# Patient Record
Sex: FEMALE | Race: WHITE | NOT HISPANIC OR LATINO | URBAN - METROPOLITAN AREA
[De-identification: names, ages, dates, MRNs, and addresses within clinical notes are randomized per-mention and may not be internally consistent; named-entity substitution may affect disease eponyms.]

---

## 2023-05-03 ENCOUNTER — OFFICE VISIT (OUTPATIENT)
Dept: INTERNAL MEDICINE CLINIC | Facility: CLINIC | Age: 53
End: 2023-05-03

## 2023-05-03 VITALS
BODY MASS INDEX: 36.62 KG/M2 | HEART RATE: 79 BPM | SYSTOLIC BLOOD PRESSURE: 122 MMHG | DIASTOLIC BLOOD PRESSURE: 76 MMHG | WEIGHT: 199 LBS | OXYGEN SATURATION: 98 % | HEIGHT: 62 IN | TEMPERATURE: 97.3 F

## 2023-05-03 DIAGNOSIS — I10 PRIMARY HYPERTENSION: ICD-10-CM

## 2023-05-03 DIAGNOSIS — N95.1 POSTMENOPAUSAL SYNDROME: ICD-10-CM

## 2023-05-03 DIAGNOSIS — E78.1 HYPERTRIGLYCERIDEMIA: ICD-10-CM

## 2023-05-03 DIAGNOSIS — K29.70 GASTRITIS WITHOUT BLEEDING, UNSPECIFIED CHRONICITY, UNSPECIFIED GASTRITIS TYPE: ICD-10-CM

## 2023-05-03 DIAGNOSIS — G25.81 RESTLESS LEG SYNDROME: ICD-10-CM

## 2023-05-03 DIAGNOSIS — E78.5 HYPERLIPIDEMIA, UNSPECIFIED HYPERLIPIDEMIA TYPE: ICD-10-CM

## 2023-05-03 DIAGNOSIS — G47.31 PRIMARY CENTRAL SLEEP APNEA: ICD-10-CM

## 2023-05-03 DIAGNOSIS — E11.40 TYPE 2 DIABETES MELLITUS WITH DIABETIC NEUROPATHY, WITHOUT LONG-TERM CURRENT USE OF INSULIN (HCC): Primary | ICD-10-CM

## 2023-05-03 DIAGNOSIS — Z12.31 BREAST CANCER SCREENING BY MAMMOGRAM: ICD-10-CM

## 2023-05-03 RX ORDER — BROMOCRIPTINE MESYLATE 2.5 MG/1
2.5 TABLET ORAL DAILY
COMMUNITY
Start: 2023-03-16

## 2023-05-03 RX ORDER — METFORMIN HYDROCHLORIDE 500 MG/1
2 TABLET, EXTENDED RELEASE ORAL 2 TIMES DAILY WITH MEALS
COMMUNITY
Start: 2023-01-24

## 2023-05-03 RX ORDER — FENOFIBRATE 145 MG/1
145 TABLET, COATED ORAL DAILY
COMMUNITY
Start: 2023-01-05 | End: 2024-01-05

## 2023-05-03 RX ORDER — DICYCLOMINE HYDROCHLORIDE 10 MG/1
10 CAPSULE ORAL 2 TIMES DAILY
COMMUNITY
Start: 2023-03-16 | End: 2024-03-15

## 2023-05-03 RX ORDER — EZETIMIBE 10 MG/1
10 TABLET ORAL DAILY
COMMUNITY
Start: 2023-03-16

## 2023-05-03 RX ORDER — GABAPENTIN 300 MG/1
300 CAPSULE ORAL 2 TIMES DAILY
COMMUNITY
Start: 2023-01-03

## 2023-05-03 RX ORDER — ESTRADIOL 1 MG/1
1 TABLET ORAL DAILY
COMMUNITY
Start: 2023-03-09

## 2023-05-03 RX ORDER — LISINOPRIL 10 MG/1
10 TABLET ORAL DAILY
COMMUNITY
Start: 2023-03-16

## 2023-05-03 RX ORDER — CHLORAL HYDRATE 500 MG
2000 CAPSULE ORAL DAILY
Qty: 180 CAPSULE | Refills: 3 | Status: SHIPPED | OUTPATIENT
Start: 2023-05-03

## 2023-05-03 RX ORDER — CHLORAL HYDRATE 500 MG
2 CAPSULE ORAL 2 TIMES DAILY
COMMUNITY
Start: 2022-11-04 | End: 2023-05-03

## 2023-05-03 RX ORDER — OMEPRAZOLE 20 MG/1
20 CAPSULE, DELAYED RELEASE ORAL DAILY
COMMUNITY
Start: 2023-03-16

## 2023-05-03 NOTE — PROGRESS NOTES
Name: Leelee Clark      : 1970      MRN: [id number]  Encounter Provider: Julita Choudhury MD  Encounter Date: 5/3/2023   Encounter department: [address]     1  Breast cancer screening by mammogram  -     Mammo screening bilateral w 3d & cad; Future; Expected date: 2023    2  Hyperlipidemia, unspecified hyperlipidemia type  Assessment & Plan:  History of hyperlipidemia recommend continuation of low-cholesterol diet weight reduction and continuation of Zetia updated lipid profile has been requested  Orders:  -     Lipid panel; Future    3  Type 2 diabetes mellitus with diabetic neuropathy, without long-term current use of insulin (HCC)  Assessment & Plan:  Blood sugars trending downward as the patient loses weight will discontinue Januvia as blood sugars have dropped under 100 on several occasions  Continue metformin for now reevaluation in 4 weeks  Lab Results   Component Value Date    HGBA1C 7 6 (H) 10/28/2022       Orders:  -     Comprehensive metabolic panel; Future    4  Restless leg syndrome  Assessment & Plan:  Restless leg syndrome symptoms well controlled on bromocriptine continue 2 5 mg daily  5  Postmenopausal syndrome  Assessment & Plan:  Postmenopausal syndrome currently on Estrace 1 mg daily recommend regular follow-up visits with gynecology  6  Primary hypertension  Assessment & Plan:  Current blood pressure assessment indicates adequate control continue lisinopril at 10 mg/day at this time if patient continues to lose weight may be able to taper dose of medication down further to 5 mg  7  Primary central sleep apnea  Assessment & Plan:  Previously diagnosed with central sleep apnea currently on CPAP device  May be worthwhile rechecking with new study if patient continues to lose weight        8  Hypertriglyceridemia  Assessment & Plan:  Elevated triglycerides due for updated study request forced lipid profile has been provided to the patient continue restricted carbohydrates and concentrated sugars in the diet along with fenofibrate at current dose      9  Gastritis without bleeding, unspecified chronicity, unspecified gastritis type  Assessment & Plan:  Patient has a history of gastritis currently on omeprazole 20 mg daily advised continuation of current therapy no focal tenderness over the stomach on examination today avoid high acid foods and excessive amounts of caffeine consumption  BMI Counseling: Body mass index is 36 99 kg/m²  The BMI is above normal  Nutrition recommendations include decreasing portion sizes, decreasing fast food intake, consuming healthier snacks, limiting drinks that contain sugar, moderation in carbohydrate intake and reducing intake of cholesterol  Exercise recommendations include exercising 3-5 times per week  No pharmacotherapy was ordered  Rationale for BMI follow-up plan is due to patient being overweight or obese  Depression Screening and Follow-up Plan: Patient was screened for depression during today's encounter  They screened negative with a PHQ-2 score of 0  Subjective      Pleasant 57-year-old female patient presents to our office today tablets medical care with our practice  During today's visit with the patient we have reviewed her existing medical conditions as well as current therapy  Her medical history is significant for type 2 diabetes that has been present since 2012  Currently she is on a combination of Januvia and metformin  She is embarked on a diet to reduce calorie consumption which she has been successful with the help of noon program   She currently is down 20 pounds from her previous weights  She reports that checking her blood sugars at home has shown a trend down from 140 glucose typically in the morning before she started losing weight now down to the 90-1 05 range and occasionally under 90    Her last hemoglobin A1c which was performed in April was 6 4%  In view of her decreased blood sugars I have requested that she discontinue her Januvia but continue her metformin  Patient also has a history of hypertension and continues on current therapy for hypertension  She describes no signs or symptoms of uncontrolled hypertension  Patient has a history of peripheral neuropathy in both lower extremities believed to be secondary to diabetes currently she is on Neurontin 300 mg twice a day with good control of her symptoms  Patient is also been diagnosed with hyperlipidemia and hypertriglyceridemia and is under treatment with Zetia as well as fenofibrate and 3 fatty acids  She has proven to be intolerant of statin therapy  Patient does have a history of gastritis and acid reflux symptoms currently on 20 mg of Prilosec with good control    Patient has a history of restless leg syndrome as well as postmenopausal syndrome  Her surgical history is significant for a hysterectomy with 1 ovary remaining  Also has a history of carpal tunnel syndrome surgical correction of septal deviation and urethral dilatation as a child  She is also been diagnosed in the past with sleep apnea and uses a CPAP machine on a nightly basis  The patient's colonoscopy was most recently performed 2 years ago with no evidence of any polyps and will be due for a follow-up colonoscopy 10 years from that previous study  Patient is due for a mammogram and a request for this examination was provided to her today  Review of Systems   Neurological: Positive for light-headedness  All other systems reviewed and are negative        Current Outpatient Medications on File Prior to Visit   Medication Sig    bromocriptine (PARLODEL) 2 5 mg tablet Take 2 5 mg by mouth daily    dicyclomine (BENTYL) 10 mg capsule Take 10 mg by mouth 2 (two) times a day    estradiol (ESTRACE) 1 mg tablet Take 1 mg by mouth daily    ezetimibe (ZETIA) 10 mg tablet Take 10 mg by mouth daily    "fenofibrate (TRICOR) 145 mg tablet Take 145 mg by mouth daily    gabapentin (NEURONTIN) 300 mg capsule Take 300 mg by mouth 2 (two) times a day    lisinopril (ZESTRIL) 10 mg tablet Take 10 mg by mouth daily    metFORMIN (GLUCOPHAGE-XR) 500 mg 24 hr tablet Take 2 tablets by mouth 2 (two) times a day with meals    omeprazole (PriLOSEC) 20 mg delayed release capsule Take 20 mg by mouth daily    [DISCONTINUED] Omega-3 Fatty Acids (fish oil) 1,000 mg Take 2 g by mouth 2 (two) times a day    [DISCONTINUED] sitaGLIPtin (JANUVIA) 50 mg tablet Take 50 mg by mouth daily       Objective     /76   Pulse 79   Temp (!) 97 3 °F (36 3 °C)   Ht 5' 1 5\" (1 562 m)   Wt 90 3 kg (199 lb)   SpO2 98%   BMI 36 99 kg/m²     Physical Exam  Vitals reviewed  Constitutional:       General: She is not in acute distress  Appearance: Normal appearance  She is well-developed  She is not ill-appearing  HENT:      Head: Normocephalic  Right Ear: Hearing, tympanic membrane, ear canal and external ear normal       Left Ear: Hearing, tympanic membrane, ear canal and external ear normal       Nose: Nose normal  No mucosal edema  Mouth/Throat:      Mouth: Mucous membranes are moist       Pharynx: Oropharynx is clear  Uvula midline  Eyes:      General: Lids are normal       Extraocular Movements: Extraocular movements intact  Conjunctiva/sclera: Conjunctivae normal       Pupils: Pupils are equal, round, and reactive to light  Neck:      Thyroid: No thyromegaly  Vascular: No carotid bruit or JVD  Cardiovascular:      Rate and Rhythm: Normal rate and regular rhythm  Heart sounds: Normal heart sounds  No murmur heard  Pulmonary:      Effort: Pulmonary effort is normal  No respiratory distress  Breath sounds: Normal breath sounds  No wheezing, rhonchi or rales  Abdominal:      General: Bowel sounds are normal  There is no distension  Palpations: Abdomen is soft  There is no mass        " Tenderness: There is no abdominal tenderness  There is no guarding  Musculoskeletal:         General: No swelling or tenderness  Normal range of motion  Cervical back: Normal range of motion and neck supple  No rigidity or tenderness  Right lower leg: No edema  Left lower leg: No edema  Lymphadenopathy:      Cervical: No cervical adenopathy  Skin:     General: Skin is warm and dry  Coloration: Skin is not jaundiced or pale  Neurological:      General: No focal deficit present  Mental Status: She is alert and oriented to person, place, and time  Sensory: Sensory deficit present  Deep Tendon Reflexes: Reflexes are normal and symmetric  Comments: Peripheral neuropathy symptoms in both lower extremities   Psychiatric:         Mood and Affect: Mood normal          Speech: Speech normal          Behavior: Behavior normal  Behavior is cooperative  Thought Content:  Thought content normal          Judgment: Judgment normal        Ganga Ortiz MD

## 2023-05-03 NOTE — ASSESSMENT & PLAN NOTE
Previously diagnosed with central sleep apnea currently on CPAP device  May be worthwhile rechecking with new study if patient continues to lose weight

## 2023-05-03 NOTE — ASSESSMENT & PLAN NOTE
History of hyperlipidemia recommend continuation of low-cholesterol diet weight reduction and continuation of Zetia updated lipid profile has been requested

## 2023-05-03 NOTE — ASSESSMENT & PLAN NOTE
Elevated triglycerides due for updated study request forced lipid profile has been provided to the patient continue restricted carbohydrates and concentrated sugars in the diet along with fenofibrate at current dose

## 2023-05-03 NOTE — ASSESSMENT & PLAN NOTE
Current blood pressure assessment indicates adequate control continue lisinopril at 10 mg/day at this time if patient continues to lose weight may be able to taper dose of medication down further to 5 mg

## 2023-05-03 NOTE — ASSESSMENT & PLAN NOTE
Patient has a history of gastritis currently on omeprazole 20 mg daily advised continuation of current therapy no focal tenderness over the stomach on examination today avoid high acid foods and excessive amounts of caffeine consumption

## 2023-05-03 NOTE — ASSESSMENT & PLAN NOTE
Blood sugars trending downward as the patient loses weight will discontinue Januvia as blood sugars have dropped under 100 on several occasions  Continue metformin for now reevaluation in 4 weeks    Lab Results   Component Value Date    HGBA1C 7 6 (H) 10/28/2022

## 2023-05-03 NOTE — ASSESSMENT & PLAN NOTE
Postmenopausal syndrome currently on Estrace 1 mg daily recommend regular follow-up visits with gynecology

## 2023-06-08 ENCOUNTER — APPOINTMENT (OUTPATIENT)
Dept: LAB | Age: 53
End: 2023-06-08
Payer: COMMERCIAL

## 2023-06-08 DIAGNOSIS — E11.40 TYPE 2 DIABETES MELLITUS WITH DIABETIC NEUROPATHY, WITHOUT LONG-TERM CURRENT USE OF INSULIN (HCC): ICD-10-CM

## 2023-06-08 DIAGNOSIS — E78.5 HYPERLIPIDEMIA, UNSPECIFIED HYPERLIPIDEMIA TYPE: ICD-10-CM

## 2023-06-08 LAB
ALBUMIN SERPL BCP-MCNC: 3.5 G/DL (ref 3.5–5)
ALP SERPL-CCNC: 37 U/L (ref 46–116)
ALT SERPL W P-5'-P-CCNC: 20 U/L (ref 12–78)
ANION GAP SERPL CALCULATED.3IONS-SCNC: 4 MMOL/L (ref 4–13)
AST SERPL W P-5'-P-CCNC: 18 U/L (ref 5–45)
BILIRUB SERPL-MCNC: 0.29 MG/DL (ref 0.2–1)
BUN SERPL-MCNC: 14 MG/DL (ref 5–25)
CALCIUM SERPL-MCNC: 8.9 MG/DL (ref 8.3–10.1)
CHLORIDE SERPL-SCNC: 108 MMOL/L (ref 96–108)
CHOLEST SERPL-MCNC: 157 MG/DL
CO2 SERPL-SCNC: 26 MMOL/L (ref 21–32)
CREAT SERPL-MCNC: 0.9 MG/DL (ref 0.6–1.3)
GFR SERPL CREATININE-BSD FRML MDRD: 72 ML/MIN/1.73SQ M
GLUCOSE P FAST SERPL-MCNC: 100 MG/DL (ref 65–99)
HDLC SERPL-MCNC: 33 MG/DL
LDLC SERPL CALC-MCNC: 63 MG/DL (ref 0–100)
NONHDLC SERPL-MCNC: 124 MG/DL
POTASSIUM SERPL-SCNC: 4.1 MMOL/L (ref 3.5–5.3)
PROT SERPL-MCNC: 6.7 G/DL (ref 6.4–8.4)
SODIUM SERPL-SCNC: 138 MMOL/L (ref 135–147)
TRIGL SERPL-MCNC: 305 MG/DL

## 2023-06-08 PROCEDURE — 80053 COMPREHEN METABOLIC PANEL: CPT

## 2023-06-08 PROCEDURE — 80061 LIPID PANEL: CPT

## 2023-06-08 PROCEDURE — 36415 COLL VENOUS BLD VENIPUNCTURE: CPT

## 2023-06-09 ENCOUNTER — OFFICE VISIT (OUTPATIENT)
Dept: INTERNAL MEDICINE CLINIC | Facility: CLINIC | Age: 53
End: 2023-06-09
Payer: COMMERCIAL

## 2023-06-09 VITALS
TEMPERATURE: 97.7 F | HEART RATE: 65 BPM | BODY MASS INDEX: 34.85 KG/M2 | OXYGEN SATURATION: 98 % | WEIGHT: 189.4 LBS | HEIGHT: 62 IN

## 2023-06-09 DIAGNOSIS — E78.5 HYPERLIPIDEMIA, UNSPECIFIED HYPERLIPIDEMIA TYPE: ICD-10-CM

## 2023-06-09 DIAGNOSIS — G25.81 RESTLESS LEG SYNDROME: ICD-10-CM

## 2023-06-09 DIAGNOSIS — E11.40 TYPE 2 DIABETES MELLITUS WITH DIABETIC NEUROPATHY, WITHOUT LONG-TERM CURRENT USE OF INSULIN (HCC): Primary | ICD-10-CM

## 2023-06-09 DIAGNOSIS — E78.1 HYPERTRIGLYCERIDEMIA: ICD-10-CM

## 2023-06-09 DIAGNOSIS — K58.9 IRRITABLE BOWEL SYNDROME WITHOUT DIARRHEA: ICD-10-CM

## 2023-06-09 DIAGNOSIS — I10 PRIMARY HYPERTENSION: ICD-10-CM

## 2023-06-09 LAB — SL AMB POCT HEMOGLOBIN AIC: 5.9 (ref ?–6.5)

## 2023-06-09 PROCEDURE — 99214 OFFICE O/P EST MOD 30 MIN: CPT | Performed by: INTERNAL MEDICINE

## 2023-06-09 PROCEDURE — 83036 HEMOGLOBIN GLYCOSYLATED A1C: CPT | Performed by: INTERNAL MEDICINE

## 2023-06-09 RX ORDER — LISINOPRIL 10 MG/1
10 TABLET ORAL DAILY
Qty: 90 TABLET | Refills: 3 | Status: SHIPPED | OUTPATIENT
Start: 2023-06-09

## 2023-06-09 RX ORDER — FENOFIBRATE 145 MG/1
145 TABLET, COATED ORAL DAILY
Qty: 90 TABLET | Refills: 3 | Status: SHIPPED | OUTPATIENT
Start: 2023-06-09 | End: 2024-06-08

## 2023-06-09 RX ORDER — CHLORAL HYDRATE 500 MG
2000 CAPSULE ORAL DAILY
Qty: 180 CAPSULE | Refills: 3 | Status: SHIPPED | OUTPATIENT
Start: 2023-06-09 | End: 2023-06-14

## 2023-06-09 RX ORDER — BROMOCRIPTINE MESYLATE 2.5 MG/1
2.5 TABLET ORAL DAILY
Qty: 90 TABLET | Refills: 3 | Status: SHIPPED | OUTPATIENT
Start: 2023-06-09

## 2023-06-09 RX ORDER — EZETIMIBE 10 MG/1
10 TABLET ORAL DAILY
Qty: 90 TABLET | Refills: 3 | Status: SHIPPED | OUTPATIENT
Start: 2023-06-09

## 2023-06-09 RX ORDER — DICYCLOMINE HYDROCHLORIDE 10 MG/1
10 CAPSULE ORAL 2 TIMES DAILY
Qty: 180 CAPSULE | Refills: 3 | Status: SHIPPED | OUTPATIENT
Start: 2023-06-09 | End: 2024-06-08

## 2023-06-09 RX ORDER — METFORMIN HYDROCHLORIDE 500 MG/1
1000 TABLET, EXTENDED RELEASE ORAL 2 TIMES DAILY WITH MEALS
Qty: 360 TABLET | Refills: 3 | Status: SHIPPED | OUTPATIENT
Start: 2023-06-09

## 2023-06-09 RX ORDER — GABAPENTIN 300 MG/1
300 CAPSULE ORAL 2 TIMES DAILY
Qty: 180 CAPSULE | Refills: 3 | Status: SHIPPED | OUTPATIENT
Start: 2023-06-09

## 2023-06-09 NOTE — PROGRESS NOTES
Name: Arpita Pineda      : 1970      MRN: [id number]  Encounter Provider: Stacey Macias MD  Encounter Date: 2023   Encounter department: [address]     1  Type 2 diabetes mellitus with diabetic neuropathy, without long-term current use of insulin ([facility])  Assessment & Plan:  Current blood work includes hemoglobin A1c of 5 9% and home fasting blood sugar diary indicates good control of the type 2 diabetes in this patient continue careful diet continue efforts to reduce weight and continue metformin at 1000 mg twice a day  Lab Results   Component Value Date    HGBA1C 5 9 2023       Orders:  -     POCT hemoglobin A1c  -     metFORMIN (GLUCOPHAGE-XR) 500 mg 24 hr tablet; Take 2 tablets (1,000 mg total) by mouth 2 (two) times a day with meals  -     Hemoglobin A1C; Future; Expected date: 10/09/2023  -     Comprehensive metabolic panel; Future; Expected date: 10/09/2023    2  Primary hypertension  Assessment & Plan:  Blood pressure assessment today confirms good control recommend continuation of lisinopril at 10 mg daily    Orders:  -     lisinopril (ZESTRIL) 10 mg tablet; Take 1 tablet (10 mg total) by mouth daily    3  Hyperlipidemia, unspecified hyperlipidemia type  Assessment & Plan:  Cholesterol profile reviewed with patient recommend continuation of low-cholesterol diet weight reduction and continuation of Zetia 10 mg daily    Orders:  -     ezetimibe (ZETIA) 10 mg tablet; Take 1 tablet (10 mg total) by mouth daily  -     fenofibrate (TRICOR) 145 mg tablet; Take 1 tablet (145 mg total) by mouth daily  -     Omega-3 Fatty Acids (fish oil) 1,000 mg; Take 2 capsules (2,000 mg total) by mouth daily  -     Lipid panel; Future; Expected date: 10/09/2023    4   Restless leg syndrome  Assessment & Plan:  Restless leg syndrome remains well controlled and stable on Parlodel 2 5 mg daily new prescription provided for this medication    Orders:  - gabapentin (NEURONTIN) 300 mg capsule; Take 1 capsule (300 mg total) by mouth 2 (two) times a day  -     bromocriptine (PARLODEL) 2 5 mg tablet; Take 1 tablet (2 5 mg total) by mouth daily    5  Irritable bowel syndrome without diarrhea  -     dicyclomine (BENTYL) 10 mg capsule; Take 1 capsule (10 mg total) by mouth 2 (two) times a day    6  Hypertriglyceridemia  Assessment & Plan:  Triglyceride profile reviewed with the patient recommend continued efforts to reduce carbohydrate consumption reduce weight and continue fenofibrate 145 mg daily and omega-3 fatty acid supplementation at 2 g daily           Subjective      This pleasant 52-year-old female patient returns to our office today to review recent blood work need to cholesterol triglyceride and diabetic management  Also a review of her hypertension  On her last visit we discontinued her Jardiance medication as blood sugars seem to be trending downward  Follow-up today indicates good blood sugar control seen just metformin to 1000 mg twice a day  She brings in with her today a diary of her home blood sugars which average in the not high 90-1 20 range  No hypoglycemic episodes are noted and no extremely high glucose values are noted  Diabetes  She has type 2 diabetes mellitus  No MedicAlert identification noted  The initial diagnosis of diabetes was made 11 years ago  Pertinent negatives for hypoglycemia include no confusion, dizziness, headaches, hunger, mood changes, nervousness/anxiousness, pallor, seizures, sleepiness, speech difficulty, sweats or tremors  Associated symptoms include weight loss  Pertinent negatives for diabetes include no blurred vision, no chest pain, no fatigue, no foot paresthesias, no foot ulcerations, no polydipsia, no polyphagia, no polyuria, no visual change and no weakness   Pertinent negatives for hypoglycemia complications include no blackouts, no hospitalization, no nocturnal hypoglycemia, no required assistance and no required glucagon injection  Symptoms are improving  Diabetic complications include peripheral neuropathy  Pertinent negatives for diabetic complications include no CVA, heart disease, impotence, nephropathy, PVD or retinopathy  Risk factors for coronary artery disease include dyslipidemia, family history, hypertension, obesity and post-menopausal  Current diabetic treatment includes oral agent (monotherapy)  She is compliant with treatment all of the time  Her weight is decreasing steadily  She is following a generally healthy diet  When asked about meal planning, she reported none  She has not had a previous visit with a dietitian  She participates in exercise every other day  She monitors blood glucose at home 1-2 x per day  She monitors urine at home <1 x per month  Blood glucose monitoring compliance is good  There is no change in her home blood glucose trend  Her breakfast blood glucose is taken between 6-7 am  Her breakfast blood glucose range is generally  mg/dl  She sees a podiatrist Eye exam is current  Review of Systems   Constitutional: Positive for weight loss  Negative for fatigue  Eyes: Negative for blurred vision  Cardiovascular: Negative for chest pain  Endocrine: Negative for polydipsia, polyphagia and polyuria  Genitourinary: Negative for impotence  Skin: Negative for pallor  Neurological: Negative for dizziness, tremors, seizures, speech difficulty, weakness and headaches  Psychiatric/Behavioral: Negative for confusion  The patient is not nervous/anxious  All other systems reviewed and are negative        Current Outpatient Medications on File Prior to Visit   Medication Sig   • estradiol (ESTRACE) 1 mg tablet Take 1 mg by mouth daily   • omeprazole (PriLOSEC) 20 mg delayed release capsule Take 20 mg by mouth daily   • [DISCONTINUED] bromocriptine (PARLODEL) 2 5 mg tablet Take 2 5 mg by mouth daily   • [DISCONTINUED] dicyclomine (BENTYL) 10 mg capsule Take 10 mg by mouth "2 (two) times a day   • [DISCONTINUED] ezetimibe (ZETIA) 10 mg tablet Take 10 mg by mouth daily   • [DISCONTINUED] fenofibrate (TRICOR) 145 mg tablet Take 145 mg by mouth daily   • [DISCONTINUED] gabapentin (NEURONTIN) 300 mg capsule Take 300 mg by mouth 2 (two) times a day   • [DISCONTINUED] lisinopril (ZESTRIL) 10 mg tablet Take 10 mg by mouth daily   • [DISCONTINUED] metFORMIN (GLUCOPHAGE-XR) 500 mg 24 hr tablet Take 2 tablets by mouth 2 (two) times a day with meals   • [DISCONTINUED] Omega-3 Fatty Acids (fish oil) 1,000 mg Take 2 capsules (2,000 mg total) by mouth daily       Objective     Pulse 65   Temp 97 7 °F (36 5 °C)   Ht 5' 1 5\" (1 562 m)   Wt 85 9 kg (189 lb 6 4 oz)   SpO2 98%   BMI 35 21 kg/m²     Physical Exam  Vitals reviewed  Constitutional:       General: She is not in acute distress  Appearance: Normal appearance  She is well-developed  She is not ill-appearing  HENT:      Head: Normocephalic  Right Ear: Hearing and external ear normal       Left Ear: Hearing and external ear normal       Nose: Nose normal  No mucosal edema  Mouth/Throat:      Pharynx: Uvula midline  Eyes:      General: Lids are normal       Conjunctiva/sclera: Conjunctivae normal       Pupils: Pupils are equal, round, and reactive to light  Neck:      Thyroid: No thyromegaly  Vascular: No carotid bruit or JVD  Cardiovascular:      Rate and Rhythm: Normal rate and regular rhythm  Heart sounds: Normal heart sounds  No murmur heard  Pulmonary:      Effort: Pulmonary effort is normal  No respiratory distress  Breath sounds: Normal breath sounds  No wheezing or rhonchi  Abdominal:      General: Bowel sounds are normal       Palpations: Abdomen is soft  Musculoskeletal:         General: Normal range of motion  Lymphadenopathy:      Cervical: No cervical adenopathy  Skin:     General: Skin is warm and dry     Neurological:      Mental Status: She is alert and oriented to person, place, " and time  Mental status is at baseline  Deep Tendon Reflexes: Reflexes are normal and symmetric  Reflexes normal    Psychiatric:         Speech: Speech normal          Behavior: Behavior normal  Behavior is cooperative  Thought Content:  Thought content normal          Judgment: Judgment normal        Eric Ferrell MD

## 2023-06-09 NOTE — ASSESSMENT & PLAN NOTE
Current blood work includes hemoglobin A1c of 5 9% and home fasting blood sugar diary indicates good control of the type 2 diabetes in this patient continue careful diet continue efforts to reduce weight and continue metformin at 1000 mg twice a day  Lab Results   Component Value Date    HGBA1C 5 9 06/09/2023

## 2023-06-09 NOTE — ASSESSMENT & PLAN NOTE
Restless leg syndrome remains well controlled and stable on Parlodel 2 5 mg daily new prescription provided for this medication

## 2023-06-09 NOTE — ASSESSMENT & PLAN NOTE
Blood pressure assessment today confirms good control recommend continuation of lisinopril at 10 mg daily

## 2023-06-09 NOTE — ASSESSMENT & PLAN NOTE
Cholesterol profile reviewed with patient recommend continuation of low-cholesterol diet weight reduction and continuation of Zetia 10 mg daily

## 2023-06-09 NOTE — ASSESSMENT & PLAN NOTE
Triglyceride profile reviewed with the patient recommend continued efforts to reduce carbohydrate consumption reduce weight and continue fenofibrate 145 mg daily and omega-3 fatty acid supplementation at 2 g daily

## 2023-06-12 ENCOUNTER — TELEPHONE (OUTPATIENT)
Dept: ADMINISTRATIVE | Facility: OTHER | Age: 53
End: 2023-06-12

## 2023-06-12 NOTE — TELEPHONE ENCOUNTER
Upon review of the In Basket request and the patient's chart, initial outreach has been made via fax to facility  Please see Contacts section for details       Thank you  Jossue Xavier MA

## 2023-06-12 NOTE — TELEPHONE ENCOUNTER
----- Message from Sunnie Olszewski, [address] sent at 6/9/2023  2:02 PM EDT -----  Regarding: care gap request  06/09/23 2:02 PM    Hello, our patient attached above has had Diabetic Foot Exam completed/performed  Please assist in updating the patient chart by pulling a previous Electronic Medical Record (EMR) document  The previous EMR is dr Frankey Sidles  The date of service is 3/2023    Thank you,  Sunnie Olszewski  [facility] INTERNAL [facility]

## 2023-06-14 DIAGNOSIS — E78.5 HYPERLIPIDEMIA, UNSPECIFIED HYPERLIPIDEMIA TYPE: Primary | ICD-10-CM

## 2023-06-14 RX ORDER — OMEGA-3-ACID ETHYL ESTERS 1 G/1
2 CAPSULE, LIQUID FILLED ORAL DAILY
Qty: 180 CAPSULE | Refills: 3 | Status: SHIPPED | OUTPATIENT
Start: 2023-06-14 | End: 2023-07-07 | Stop reason: CLARIF

## 2023-06-14 NOTE — TELEPHONE ENCOUNTER
Upon review of the In Basket request we were able to locate, review, and update the patient chart as requested for Diabetic Foot Exam     Any additional questions or concerns should be emailed to the Practice Liaisons via the appropriate education email address, please do not reply via In Basket      Thank you  Anika Flower MA

## 2023-06-16 LAB
DME PARACHUTE DELIVERY DATE REQUESTED: NORMAL
DME PARACHUTE ITEM DESCRIPTION: NORMAL
DME PARACHUTE ORDER STATUS: NORMAL
DME PARACHUTE SUPPLIER NAME: NORMAL
DME PARACHUTE SUPPLIER PHONE: NORMAL

## 2023-06-19 LAB
DME PARACHUTE DELIVERY DATE ACTUAL: NORMAL
DME PARACHUTE DELIVERY DATE REQUESTED: NORMAL
DME PARACHUTE ITEM DESCRIPTION: NORMAL
DME PARACHUTE ORDER STATUS: NORMAL
DME PARACHUTE SUPPLIER NAME: NORMAL
DME PARACHUTE SUPPLIER PHONE: NORMAL

## 2023-07-07 ENCOUNTER — TELEPHONE (OUTPATIENT)
Dept: INTERNAL MEDICINE CLINIC | Facility: CLINIC | Age: 53
End: 2023-07-07

## 2023-07-07 DIAGNOSIS — E78.1 HYPERTRIGLYCERIDEMIA: Primary | ICD-10-CM

## 2023-07-07 RX ORDER — ICOSAPENT ETHYL 1000 MG/1
2 CAPSULE ORAL 2 TIMES DAILY
Qty: 360 CAPSULE | Refills: 3 | Status: SHIPPED | OUTPATIENT
Start: 2023-07-07

## 2023-07-07 NOTE — TELEPHONE ENCOUNTER
The doctor was trying to send in a prescription for me for [address] I just spoke with [facility] and apparently my plan does not cover Lovaza. But it does cover [facility]. So if you could just check with Doctor Bin Lot if he is OK with prescribing that instead or if he wants me to go back to using over the counter fish oil. And then if somebody could just get back to me, that would be great. You can call me back. I also checked the portal often.

## 2023-07-14 ENCOUNTER — HOSPITAL ENCOUNTER (OUTPATIENT)
Dept: RADIOLOGY | Age: 53
Discharge: HOME | End: 2023-07-14
Payer: COMMERCIAL

## 2023-07-14 VITALS — BODY MASS INDEX: 32.94 KG/M2 | HEIGHT: 62 IN | WEIGHT: 179 LBS

## 2023-07-14 DIAGNOSIS — Z12.31 BREAST CANCER SCREENING BY MAMMOGRAM: ICD-10-CM

## 2023-07-14 PROCEDURE — 77063 BREAST TOMOSYNTHESIS BI: CPT

## 2023-07-14 PROCEDURE — 77067 SCR MAMMO BI INCL CAD: CPT

## 2023-07-19 ENCOUNTER — TELEPHONE (OUTPATIENT)
Dept: INTERNAL MEDICINE CLINIC | Facility: CLINIC | Age: 53
End: 2023-07-19

## 2023-08-17 ENCOUNTER — OFFICE VISIT (OUTPATIENT)
Dept: PODIATRY | Facility: CLINIC | Age: 53
End: 2023-08-17
Payer: COMMERCIAL

## 2023-08-17 VITALS
HEART RATE: 76 BPM | SYSTOLIC BLOOD PRESSURE: 140 MMHG | HEIGHT: 62 IN | DIASTOLIC BLOOD PRESSURE: 82 MMHG | WEIGHT: 183.2 LBS | BODY MASS INDEX: 33.71 KG/M2

## 2023-08-17 DIAGNOSIS — E11.40 TYPE 2 DIABETES MELLITUS WITH DIABETIC NEUROPATHY, WITHOUT LONG-TERM CURRENT USE OF INSULIN (HCC): Primary | ICD-10-CM

## 2023-08-17 PROCEDURE — 99203 OFFICE O/P NEW LOW 30 MIN: CPT | Performed by: PODIATRIST

## 2023-08-17 NOTE — PROGRESS NOTES
This patient was seen on 8/17/23. My role is Foot , Ankle, and Wound Specialist    SUBJECTIVE    Chief Complaint:  Diabetic foot check     Patient ID: Kenisha Lai is a 52 y.o. female. Juwan Ramachandran is here for the first time for an annual diabetic foot check. She denies barefoot walking nor prior history of foot lesions. Her last A1C was a 5.9 on 6/9/23. The following portions of the patient's history were reviewed and updated as appropriate: allergies, current medications, past family history, past medical history, past social history, past surgical history and problem list.    Review of Systems   Constitutional: Negative. Respiratory: Negative. Skin: Negative for wound. Neurological: Positive for numbness. OBJECTIVE      /82   Pulse 76   Ht 5' 1.5" (1.562 m) Comment: verbal  Wt 83.1 kg (183 lb 3.2 oz)   BMI 34.05 kg/m²     Foot/Ankle Musculoskeletal Exam    Inspection    Right      Right foot/ankle inspection is normal.     Left      Left foot/ankle inspection is normal.      Neurovascular    Right      Dorsalis pedis: 2+      Posterior tibial: 2+    Left      Dorsalis pedis: 2+      Posterior tibial: 2+    General    Neurological: alert       Physical Exam  Vitals and nursing note reviewed. Constitutional:       General: She is not in acute distress. Appearance: Normal appearance. She is not ill-appearing, toxic-appearing or diaphoretic. HENT:      Head: Normocephalic and atraumatic. Cardiovascular:      Pulses: Normal pulses. no weak pulses          Dorsalis pedis pulses are 2+ on the right side and 2+ on the left side. Posterior tibial pulses are 2+ on the right side and 2+ on the left side. Pulmonary:      Effort: Pulmonary effort is normal.   Feet:      Right foot:      Skin integrity: No ulcer, skin breakdown, erythema, warmth, callus or dry skin. Left foot:      Skin integrity: No ulcer, skin breakdown, erythema, warmth, callus or dry skin. Skin:     General: Skin is warm. Capillary Refill: Capillary refill takes less than 2 seconds. Neurological:      General: No focal deficit present. Mental Status: She is alert and oriented to person, place, and time. Diabetic Foot Exam    Patient's shoes and socks removed. Right Foot/Ankle   Right Foot Inspection  Skin Exam: skin normal and skin intact. No dry skin, no warmth, no callus, no erythema, no maceration, no abnormal color, no pre-ulcer, no ulcer and no callus. Toe Exam: ROM and strength within normal limits. Sensory   Vibration: intact  Proprioception: intact  Monofilament testing: intact    Vascular  Capillary refills: < 3 seconds  The right DP pulse is 2+. The right PT pulse is 2+. Left Foot/Ankle  Left Foot Inspection  Skin Exam: skin normal and skin intact. No dry skin, no warmth, no erythema, no maceration, normal color, no pre-ulcer, no ulcer and no callus. Toe Exam: ROM and strength within normal limits. Sensory   Vibration: intact  Proprioception: intact  Monofilament testing: intact    Vascular  Capillary refills: < 3 seconds  The left DP pulse is 2+. The left PT pulse is 2+. Assign Risk Category  No deformity present  No loss of protective sensation  No weak pulses  Risk: 0    ASSESSMENT     Diagnoses and all orders for this visit:    Type 2 diabetes mellitus with diabetic neuropathy, without long-term current use of insulin (HCC)  -     Diabetic Shoe Inserts  -     Diabetic Shoe         Problem List Items Addressed This Visit        Endocrine    Type 2 diabetes mellitus with diabetic neuropathy, without long-term current use of insulin ([address]) - Primary    Relevant Orders    Diabetic Shoe Inserts    Diabetic Shoe           PLAN    Her current risk is very low for diabetic foot complications.  Foot precautions reviewed with patient including the need to wear protective shoegear at all times when walking including in the home, the need to check feet all surfaces daily with a mirror and report and skin breaks to podiatry, the need to apply an emollient to skin of feet daily.  Rx given for diabetic shoegear at her request.

## 2023-08-23 DIAGNOSIS — B00.9 HERPES SIMPLEX: Primary | ICD-10-CM

## 2023-08-23 RX ORDER — VALACYCLOVIR HYDROCHLORIDE 1 G/1
1000 TABLET, FILM COATED ORAL 2 TIMES DAILY
Qty: 10 TABLET | Refills: 0 | Status: SHIPPED | OUTPATIENT
Start: 2023-08-23 | End: 2023-08-28

## 2023-10-11 ENCOUNTER — APPOINTMENT (OUTPATIENT)
Dept: LAB | Age: 53
End: 2023-10-11
Payer: COMMERCIAL

## 2023-10-11 DIAGNOSIS — E78.5 HYPERLIPIDEMIA, UNSPECIFIED HYPERLIPIDEMIA TYPE: ICD-10-CM

## 2023-10-11 DIAGNOSIS — E11.40 TYPE 2 DIABETES MELLITUS WITH DIABETIC NEUROPATHY, WITHOUT LONG-TERM CURRENT USE OF INSULIN (HCC): ICD-10-CM

## 2023-10-11 LAB
ALBUMIN SERPL BCP-MCNC: 4.2 G/DL (ref 3.5–5)
ALP SERPL-CCNC: 31 U/L (ref 34–104)
ALT SERPL W P-5'-P-CCNC: 10 U/L (ref 7–52)
ANION GAP SERPL CALCULATED.3IONS-SCNC: 4 MMOL/L
AST SERPL W P-5'-P-CCNC: 14 U/L (ref 13–39)
BILIRUB SERPL-MCNC: 0.3 MG/DL (ref 0.2–1)
BUN SERPL-MCNC: 16 MG/DL (ref 5–25)
CALCIUM SERPL-MCNC: 9.1 MG/DL (ref 8.4–10.2)
CHLORIDE SERPL-SCNC: 105 MMOL/L (ref 96–108)
CHOLEST SERPL-MCNC: 166 MG/DL
CO2 SERPL-SCNC: 30 MMOL/L (ref 21–32)
CREAT SERPL-MCNC: 0.82 MG/DL (ref 0.6–1.3)
EST. AVERAGE GLUCOSE BLD GHB EST-MCNC: 126 MG/DL
GFR SERPL CREATININE-BSD FRML MDRD: 80 ML/MIN/1.73SQ M
GLUCOSE P FAST SERPL-MCNC: 90 MG/DL (ref 65–99)
HBA1C MFR BLD: 6 %
HDLC SERPL-MCNC: 38 MG/DL
LDLC SERPL CALC-MCNC: 92 MG/DL (ref 0–100)
NONHDLC SERPL-MCNC: 128 MG/DL
POTASSIUM SERPL-SCNC: 4.1 MMOL/L (ref 3.5–5.3)
PROT SERPL-MCNC: 6.8 G/DL (ref 6.4–8.4)
SODIUM SERPL-SCNC: 139 MMOL/L (ref 135–147)
TRIGL SERPL-MCNC: 178 MG/DL

## 2023-10-11 PROCEDURE — 83036 HEMOGLOBIN GLYCOSYLATED A1C: CPT

## 2023-10-11 PROCEDURE — 80053 COMPREHEN METABOLIC PANEL: CPT

## 2023-10-11 PROCEDURE — 80061 LIPID PANEL: CPT

## 2023-10-11 PROCEDURE — 36415 COLL VENOUS BLD VENIPUNCTURE: CPT

## 2023-10-13 ENCOUNTER — OFFICE VISIT (OUTPATIENT)
Dept: INTERNAL MEDICINE CLINIC | Facility: CLINIC | Age: 53
End: 2023-10-13
Payer: COMMERCIAL

## 2023-10-13 VITALS
DIASTOLIC BLOOD PRESSURE: 72 MMHG | TEMPERATURE: 98.1 F | HEART RATE: 66 BPM | SYSTOLIC BLOOD PRESSURE: 118 MMHG | OXYGEN SATURATION: 92 %

## 2023-10-13 DIAGNOSIS — K29.70 GASTRITIS WITHOUT BLEEDING, UNSPECIFIED CHRONICITY, UNSPECIFIED GASTRITIS TYPE: ICD-10-CM

## 2023-10-13 DIAGNOSIS — G62.9 NEUROPATHY: ICD-10-CM

## 2023-10-13 DIAGNOSIS — I10 PRIMARY HYPERTENSION: ICD-10-CM

## 2023-10-13 DIAGNOSIS — E78.5 HYPERLIPIDEMIA, UNSPECIFIED HYPERLIPIDEMIA TYPE: ICD-10-CM

## 2023-10-13 DIAGNOSIS — Z12.11 COLON CANCER SCREENING: ICD-10-CM

## 2023-10-13 DIAGNOSIS — E11.40 TYPE 2 DIABETES MELLITUS WITH DIABETIC NEUROPATHY, WITHOUT LONG-TERM CURRENT USE OF INSULIN (HCC): Primary | ICD-10-CM

## 2023-10-13 DIAGNOSIS — E78.1 HYPERTRIGLYCERIDEMIA: ICD-10-CM

## 2023-10-13 DIAGNOSIS — H53.9 VISION CHANGES: ICD-10-CM

## 2023-10-13 DIAGNOSIS — Z13.0 SCREENING FOR DEFICIENCY ANEMIA: ICD-10-CM

## 2023-10-13 DIAGNOSIS — Z23 ENCOUNTER FOR IMMUNIZATION: ICD-10-CM

## 2023-10-13 PROCEDURE — 90686 IIV4 VACC NO PRSV 0.5 ML IM: CPT | Performed by: INTERNAL MEDICINE

## 2023-10-13 PROCEDURE — 90471 IMMUNIZATION ADMIN: CPT | Performed by: INTERNAL MEDICINE

## 2023-10-13 PROCEDURE — 99214 OFFICE O/P EST MOD 30 MIN: CPT | Performed by: INTERNAL MEDICINE

## 2023-10-13 RX ORDER — METFORMIN HYDROCHLORIDE 500 MG/1
500 TABLET, EXTENDED RELEASE ORAL 2 TIMES DAILY WITH MEALS
Qty: 180 TABLET | Refills: 3
Start: 2023-10-13

## 2023-10-13 RX ORDER — GABAPENTIN 100 MG/1
200 CAPSULE ORAL 2 TIMES DAILY
Qty: 120 CAPSULE | Refills: 2 | Status: SHIPPED | OUTPATIENT
Start: 2023-10-13

## 2023-10-13 RX ORDER — OMEPRAZOLE 20 MG/1
20 CAPSULE, DELAYED RELEASE ORAL DAILY
Qty: 90 CAPSULE | Refills: 2 | Status: SHIPPED | OUTPATIENT
Start: 2023-10-13

## 2023-10-13 NOTE — PROGRESS NOTES
Name: Jazmine Pineda      : 1970      MRN: [id number]  Encounter Provider: Cathy Marks MD  Encounter Date: 10/13/2023   Encounter department: [address] [place] [address]     1. Vision changes  -     Ambulatory Referral to Ophthalmology; Future    2. Type 2 diabetes mellitus with diabetic neuropathy, without long-term current use of insulin ([facility])  Assessment & Plan:  Blood sugar control reviewed during today's visit remains under excellent control most recent hemoglobin A1c is 6.0. Patient has decreased her metoprolol from the 1000 mg twice a day to 500 mg twice a day recommend continuation of current dosing at 500 mg twice a day follow-up testing in 4 months requested  Lab Results   Component Value Date    HGBA1C 6.0 (H) 10/11/2023       Orders:  -     metFORMIN (GLUCOPHAGE-XR) 500 mg 24 hr tablet; Take 1 tablet (500 mg total) by mouth 2 (two) times a day with meals  -     Comprehensive metabolic panel; Future; Expected date: 2024  -     Hemoglobin A1C; Future; Expected date: 2024  -     UA w Reflex to Microscopic w Reflex to Culture -Lab Collect; Future; Expected date: 2024    3. Gastritis without bleeding, unspecified chronicity, unspecified gastritis type  Assessment & Plan:  Symptoms of gastritis remain well controlled and stable recommend continuation of omeprazole at 20 mg daily new prescription sent to the patient's pharmacy    Orders:  -     omeprazole (PriLOSEC) 20 mg delayed release capsule; Take 1 capsule (20 mg total) by mouth daily    4. Neuropathy  -     gabapentin (Neurontin) 100 mg capsule; Take 2 capsules (200 mg total) by mouth 2 (two) times a day    5. Encounter for immunization  -     influenza vaccine, quadrivalent, 0.5 mL, preservative-free, for adult and pediatric patients 6 mos+ (AFLURIA, FLUARIX, FLULAVAL, FLUZONE)    6. Screening for deficiency anemia  -     CBC and differential; Future; Expected date: 2024    7. Hyperlipidemia, unspecified hyperlipidemia type  Assessment & Plan:  Recommend continuation of current cholesterol management medications along with low-cholesterol diet exercise and weight reduction    Orders:  -     Lipid panel; Future; Expected date: 02/13/2024    8. Colon cancer screening  -     Ian    9. Primary hypertension  Assessment & Plan:  Hypertension assessment during today's visit confirms good control blood pressure recommend continuation of lisinopril at 10 mg daily. 10. Hypertriglyceridemia  Assessment & Plan:  Continue low carbohydrate diet along with Tricor at current dosing 145 mg daily. Subjective      This 40-year-old female patient presents to our office today for routine follow-up visit. She has a history of type 2 diabetes and previously has been on 1000 mg of metformin twice a day administered at breakfast time and suppertime. With improved control of calorie consumption and decrease body weight the patient has indicated to me today that she tried to decrease her metformin to 500 mg twice a day. Her blood sugars remain under good control with readings under 100. Patient also indicates symptoms that are suggestive of possible ocular migraine with distorted vision in her left eye. Zigzag design of vision distortion. Episode lasted approximately 15 to 20 minutes and resolve spontaneously. She had no nausea or vomiting nor any headache associated with this episode nor any neurologic deficits. I recommended strongly that she pursue a consultation with her ophthalmologist at her earliest convenience. Patient reports that her neuropathy symptoms have diminished over the past several months. We will try to decrease her Neurontin from 300 mg twice a day down to 200 mg twice a day. Review of Systems   Eyes:  Positive for visual disturbance. Neurological:         Peripheral neuropathy symptoms   All other systems reviewed and are negative.       Current Outpatient Medications on File Prior to Visit   Medication Sig    bromocriptine (PARLODEL) 2.5 mg tablet Take 1 tablet (2.5 mg total) by mouth daily    dicyclomine (BENTYL) 10 mg capsule Take 1 capsule (10 mg total) by mouth 2 (two) times a day    estradiol (ESTRACE) 1 mg tablet Take 1 mg by mouth daily    ezetimibe (ZETIA) 10 mg tablet Take 1 tablet (10 mg total) by mouth daily    fenofibrate (TRICOR) 145 mg tablet Take 1 tablet (145 mg total) by mouth daily    Icosapent Ethyl (Vascepa) 1 g CAPS Take 2 capsules (2 g total) by mouth 2 (two) times a day    lisinopril (ZESTRIL) 10 mg tablet Take 1 tablet (10 mg total) by mouth daily    valACYclovir (VALTREX) 1,000 mg tablet Take 1 tablet (1,000 mg total) by mouth 2 (two) times a day for 5 days    [DISCONTINUED] gabapentin (NEURONTIN) 300 mg capsule Take 1 capsule (300 mg total) by mouth 2 (two) times a day    [DISCONTINUED] metFORMIN (GLUCOPHAGE-XR) 500 mg 24 hr tablet Take 2 tablets (1,000 mg total) by mouth 2 (two) times a day with meals    [DISCONTINUED] omeprazole (PriLOSEC) 20 mg delayed release capsule Take 20 mg by mouth daily       Objective     /72   Pulse 66   Temp 98.1 °F (36.7 °C)   SpO2 92%     Physical Exam  Vitals reviewed. Constitutional:       General: She is not in acute distress. Appearance: Normal appearance. She is well-developed. She is not ill-appearing. HENT:      Right Ear: Hearing and external ear normal.      Left Ear: Hearing and external ear normal.      Nose: Nose normal. No mucosal edema. Mouth/Throat:      Mouth: Mucous membranes are moist.      Pharynx: Oropharynx is clear. Uvula midline. Eyes:      General: Lids are normal.      Conjunctiva/sclera: Conjunctivae normal.      Pupils: Pupils are equal, round, and reactive to light. Neck:      Thyroid: No thyromegaly. Vascular: No carotid bruit or JVD. Cardiovascular:      Rate and Rhythm: Normal rate and regular rhythm.       Heart sounds: Normal heart sounds. No murmur heard. Pulmonary:      Effort: Pulmonary effort is normal. No respiratory distress. Breath sounds: Normal breath sounds. No wheezing, rhonchi or rales. Abdominal:      General: Bowel sounds are normal.      Palpations: Abdomen is soft. Musculoskeletal:         General: Normal range of motion. Right lower leg: No edema. Left lower leg: No edema. Lymphadenopathy:      Cervical: No cervical adenopathy. Skin:     General: Skin is warm and dry. Neurological:      General: No focal deficit present. Mental Status: She is alert and oriented to person, place, and time. Mental status is at baseline. Deep Tendon Reflexes: Reflexes are normal and symmetric. Psychiatric:         Mood and Affect: Mood normal.         Speech: Speech normal.         Behavior: Behavior normal. Behavior is cooperative. Thought Content:  Thought content normal.         Judgment: Judgment normal.       Thaddeus Doran MD

## 2023-10-13 NOTE — ASSESSMENT & PLAN NOTE
Symptoms of gastritis remain well controlled and stable recommend continuation of omeprazole at 20 mg daily new prescription sent to the patient's pharmacy

## 2023-10-13 NOTE — ASSESSMENT & PLAN NOTE
Blood sugar control reviewed during today's visit remains under excellent control most recent hemoglobin A1c is 6.0.   Patient has decreased her metoprolol from the 1000 mg twice a day to 500 mg twice a day recommend continuation of current dosing at 500 mg twice a day follow-up testing in 4 months requested  Lab Results   Component Value Date    HGBA1C 6.0 (H) 10/11/2023

## 2023-10-13 NOTE — ASSESSMENT & PLAN NOTE
Recommend continuation of current cholesterol management medications along with low-cholesterol diet exercise and weight reduction

## 2023-10-13 NOTE — ASSESSMENT & PLAN NOTE
Hypertension assessment during today's visit confirms good control blood pressure recommend continuation of lisinopril at 10 mg daily.

## 2023-11-10 LAB
LEFT EYE DIABETIC RETINOPATHY: NORMAL
RIGHT EYE DIABETIC RETINOPATHY: NORMAL

## 2023-11-20 LAB — COLOGUARD RESULT REPORTABLE: NEGATIVE

## 2023-11-30 ENCOUNTER — OFFICE VISIT (OUTPATIENT)
Dept: URGENT CARE | Age: 53
End: 2023-11-30
Payer: COMMERCIAL

## 2023-11-30 VITALS
HEART RATE: 59 BPM | OXYGEN SATURATION: 97 % | TEMPERATURE: 97.8 F | DIASTOLIC BLOOD PRESSURE: 60 MMHG | SYSTOLIC BLOOD PRESSURE: 126 MMHG | RESPIRATION RATE: 18 BRPM

## 2023-11-30 DIAGNOSIS — T78.40XA ALLERGY, INITIAL ENCOUNTER: ICD-10-CM

## 2023-11-30 DIAGNOSIS — J02.9 SORE THROAT: Primary | ICD-10-CM

## 2023-11-30 PROCEDURE — 99213 OFFICE O/P EST LOW 20 MIN: CPT | Performed by: STUDENT IN AN ORGANIZED HEALTH CARE EDUCATION/TRAINING PROGRAM

## 2023-11-30 RX ORDER — FLUTICASONE PROPIONATE 50 MCG
2 SPRAY, SUSPENSION (ML) NASAL DAILY
Qty: 11.1 ML | Refills: 0 | Status: SHIPPED | OUTPATIENT
Start: 2023-11-30

## 2023-11-30 NOTE — PATIENT INSTRUCTIONS
Use flonase nightly before bed.   Continue daily zyrtec  Focus on good hydration and rest  If your symptoms are not getting better or worsening, come back to see us or your PCP

## 2023-11-30 NOTE — PROGRESS NOTES
[facility]        NAME: Micheal Lopez is a 52 y.o. female  : 1970    MRN: [id number]  DATE: 2023  TIME: 3:32 PM    Assessment and Plan   Sore throat [J02.9]  1. Sore throat  fluticasone (FLONASE) 50 mcg/act nasal spray      2. Allergy, initial encounter  fluticasone (FLONASE) 50 mcg/act nasal spray      Most likely due to viral URI. Advise continuing Zyrtec, may add Flonase for possible postnasal drip causing her sore throat. Rest, hydration, may use warm tea with honey or warm salt water gargles. Patient Instructions       Follow up with PCP in 3-5 days. Proceed to  ER if symptoms worsen. Chief Complaint     Chief Complaint   Patient presents with    Sore Throat     Symptoms started Monday night. History of Present Illness       Patient presents for approximately 4 days of throat discomfort. She has some congestion, though notes that this is about her baseline with her allergies. She continues to take Zyrtec daily. She did start feeling a bit fatigued today today. No known sick contacts. Home COVID test was negative. No fevers, chills, cough, shortness of breath, chest pain. She denies other symptoms. Review of Systems   Review of Systems   All other systems reviewed and are negative.         Current Medications       Current Outpatient Medications:     bromocriptine (PARLODEL) 2.5 mg tablet, Take 1 tablet (2.5 mg total) by mouth daily, Disp: 90 tablet, Rfl: 3    dicyclomine (BENTYL) 10 mg capsule, Take 1 capsule (10 mg total) by mouth 2 (two) times a day, Disp: 180 capsule, Rfl: 3    estradiol (ESTRACE) 1 mg tablet, Take 1 mg by mouth daily, Disp: , Rfl:     ezetimibe (ZETIA) 10 mg tablet, Take 1 tablet (10 mg total) by mouth daily, Disp: 90 tablet, Rfl: 3    fenofibrate (TRICOR) 145 mg tablet, Take 1 tablet (145 mg total) by mouth daily, Disp: 90 tablet, Rfl: 3    fluticasone (FLONASE) 50 mcg/act nasal spray, 2 sprays into each nostril daily, Disp: 11.1 mL, Rfl: 0    gabapentin (Neurontin) 100 mg capsule, Take 2 capsules (200 mg total) by mouth 2 (two) times a day, Disp: 120 capsule, Rfl: 2    Icosapent Ethyl (Vascepa) 1 g CAPS, Take 2 capsules (2 g total) by mouth 2 (two) times a day, Disp: 360 capsule, Rfl: 3    lisinopril (ZESTRIL) 10 mg tablet, Take 1 tablet (10 mg total) by mouth daily, Disp: 90 tablet, Rfl: 3    metFORMIN (GLUCOPHAGE-XR) 500 mg 24 hr tablet, Take 1 tablet (500 mg total) by mouth 2 (two) times a day with meals, Disp: 180 tablet, Rfl: 3    omeprazole (PriLOSEC) 20 mg delayed release capsule, Take 1 capsule (20 mg total) by mouth daily, Disp: 90 capsule, Rfl: 2    valACYclovir (VALTREX) 1,000 mg tablet, Take 1 tablet (1,000 mg total) by mouth 2 (two) times a day for 5 days, Disp: 10 tablet, Rfl: 0    Current Allergies     Allergies as of 11/30/2023 - Reviewed 11/30/2023   Allergen Reaction Noted    Statins Irritability, Lightheadedness, and Other (See Comments) 05/03/2023            The following portions of the patient's history were reviewed and updated as appropriate: allergies, current medications, past family history, past medical history, past social history, past surgical history and problem list.     No past medical history on file. Past Surgical History:   Procedure Laterality Date    HYSTERECTOMY  2006    still has 1 ovary-?side       Family History   Problem Relation Age of Onset    No Known Problems Mother     Lymphoma Father     No Known Problems Sister     Stomach cancer Maternal Grandmother 50    Lung cancer Maternal Grandfather 80    No Known Problems Paternal Grandmother     No Known Problems Paternal Grandfather     No Known Problems Maternal Aunt     No Known Problems Maternal Aunt     No Known Problems Paternal Aunt     No Known Problems Paternal Aunt     Leukemia Paternal Uncle 44    Lung cancer Paternal Uncle 76         Medications have been verified.         Objective   /60   Pulse 59   Temp 97.8 °F (36.6 °C)   Resp 18   SpO2 97%   No LMP recorded. Patient has had a hysterectomy. Physical Exam     Physical Exam  Vitals and nursing note reviewed. Constitutional:       General: She is not in acute distress. Appearance: Normal appearance. She is not ill-appearing or toxic-appearing. HENT:      Head: Normocephalic and atraumatic. Right Ear: External ear normal.      Left Ear: Tympanic membrane, ear canal and external ear normal.      Ears:      Comments: Chronic scarring of right TM     Nose: Nose normal.      Mouth/Throat:      Mouth: Mucous membranes are moist.      Pharynx: Posterior oropharyngeal erythema (mild) present. Tonsils: No tonsillar exudate or tonsillar abscesses. Eyes:      Extraocular Movements: Extraocular movements intact. Cardiovascular:      Rate and Rhythm: Normal rate and regular rhythm. Heart sounds: Normal heart sounds. Pulmonary:      Effort: Pulmonary effort is normal. No respiratory distress. Breath sounds: Normal breath sounds. No stridor. No wheezing, rhonchi or rales. Musculoskeletal:         General: No swelling, tenderness or deformity. Right lower leg: No edema. Left lower leg: No edema. Lymphadenopathy:      Cervical: No cervical adenopathy. Skin:     General: Skin is warm and dry. Capillary Refill: Capillary refill takes less than 2 seconds. Findings: No rash. Neurological:      Mental Status: She is alert.       Gait: Gait normal.   Psychiatric:         Behavior: Behavior normal.

## 2024-01-07 DIAGNOSIS — G62.9 NEUROPATHY: ICD-10-CM

## 2024-01-08 RX ORDER — GABAPENTIN 100 MG/1
200 CAPSULE ORAL 2 TIMES DAILY
Qty: 240 CAPSULE | Refills: 0 | Status: SHIPPED | OUTPATIENT
Start: 2024-01-08

## 2024-01-10 ENCOUNTER — TELEMEDICINE (OUTPATIENT)
Dept: INTERNAL MEDICINE CLINIC | Facility: CLINIC | Age: 54
End: 2024-01-10
Payer: COMMERCIAL

## 2024-01-10 ENCOUNTER — TELEPHONE (OUTPATIENT)
Dept: INTERNAL MEDICINE CLINIC | Facility: CLINIC | Age: 54
End: 2024-01-10

## 2024-01-10 VITALS — WEIGHT: 172 LBS | HEIGHT: 62 IN | BODY MASS INDEX: 31.65 KG/M2

## 2024-01-10 DIAGNOSIS — U07.1 COVID: Primary | ICD-10-CM

## 2024-01-10 PROCEDURE — 99213 OFFICE O/P EST LOW 20 MIN: CPT | Performed by: INTERNAL MEDICINE

## 2024-01-10 RX ORDER — NIRMATRELVIR AND RITONAVIR 300-100 MG
3 KIT ORAL 2 TIMES DAILY
Qty: 30 TABLET | Refills: 0 | Status: SHIPPED | OUTPATIENT
Start: 2024-01-10 | End: 2024-01-15

## 2024-01-10 NOTE — PROGRESS NOTES
COVID-19 Outpatient Progress Note    Assessment/Plan:    Problem List Items Addressed This Visit          Other    COVID - Primary     Presents today by virtual visit for evaluation and management of recent conversion to COVID-positive state.  She and her partner both have COVID symptoms.  She currently has symptoms of congestion body aches lightheadedness fatigue.  She does have a history of diabetes and hypertension and we discussed Paxlovid which she is agreeable to take.  Current GFR reading is 80 cc/min.  She will take full dose Paxlovid for period of 5 days.  In addition I recommended vitamin C at 1000 units vitamin D 2000 units and zinc 2025 mg all 3 daily for 2-week period of time.  Patient maintains quarantine for period of 5 days followed by 5 days of strict masking.  Extra rest good nutrition good hydration all recommended.  Follow-up if symptoms worsen or fail to respond to our treatment.         Relevant Medications    nirmatrelvir & ritonavir (Paxlovid, 300/100,) tablet therapy pack      Disposition:     I have spent a total time of 20 minutes on the day of the encounter for this patient including discussing diagnostic results, discussing prognosis, risks and benefits of treatment options, instructions for management, patient and family education, importance of treatment compliance, risk factor reductions, impressions, counseling/coordination of care, documenting in the medical record, reviewing/ordering tests, medicine, procedures and obtaining or reviewing history.       Encounter provider: Yo Tanner MD     Provider located at: [address] [place] [address] MEDICINE  [address] [facility] [id number]     Recent Visits  No visits were found meeting these conditions.  Showing recent visits within past 7 days and meeting all other requirements  Today's Visits  Date Type Provider Dept   01/10/24 Telemedicine Yo Tanner MD [facility] Internal Med   01/10/24  Telephone Stanley Sandoval MA [facility] Internal Med   Showing today's visits and meeting all other requirements  Future Appointments  No visits were found meeting these conditions.  Showing future appointments within next 150 days and meeting all other requirements     This virtual check-in was done via [employer] and patient was informed that this is a secure, HIPAA-compliant platform. She agrees to proceed.    Patient agrees to participate in a virtual check in via telephone or video visit instead of presenting to the office to address urgent/immediate medical needs. Patient is aware this is a billable service. She acknowledged consent and understanding of privacy and security of the video platform. The patient has agreed to participate and understands they can discontinue the visit at any time.    After connecting through [employer], the patient was identified by name and date of birth. Lorena Phillips was informed that this was a telemedicine visit and that the exam was being conducted confidentially over secure lines. My office door was closed. No one else was in the room. Lorena Phillips acknowledged consent and understanding of privacy and security of the telemedicine visit. I informed the patient that I have reviewed her record in Epic and presented the opportunity for her to ask any questions regarding the visit today. The patient agreed to participate.     Verification of patient location:  Patient is located in the following state in which I hold an active license: [place]    Subjective:   Lorena Phillips is a 53 y.o. female who is concerned about COVID-19. Patient's symptoms include fatigue, malaise, nasal congestion, myalgias and headache.     - Date of symptom onset: 1/9/2024      COVID-19 vaccination status: Fully vaccinated with booster    Exposure:   Contact with a person who is under investigation (PUI) for or who is positive for COVID-19 within the last 14 days?: Yes    Hospitalized recently for  "fever and/or lower respiratory symptoms?: No      Currently a healthcare worker that is involved in direct patient care?: No      Works in a special setting where the risk of COVID-19 transmission may be high? (this may include long-term care, correctional and MCC facilities; homeless shelters; assisted-living facilities and group homes.): No      Resident in a special setting where the risk of COVID-19 transmission may be high? (this may include long-term care, correctional and MCC facilities; homeless shelters; assisted-living facilities and group homes.): No      No results found for: \"SARSCOV2\", \"UEWEAEN2AHM\", \"SARSCORONAVI\", \"CORONAVIRUSR\", \"SARSCOVAG\", \"SARSCOVAGH\"    Review of Systems   Constitutional:  Positive for fatigue.   HENT:  Positive for congestion.    Musculoskeletal:  Positive for myalgias.   Neurological:  Positive for headaches.     Current Outpatient Medications on File Prior to Visit   Medication Sig    bromocriptine (PARLODEL) 2.5 mg tablet Take 1 tablet (2.5 mg total) by mouth daily    dicyclomine (BENTYL) 10 mg capsule Take 1 capsule (10 mg total) by mouth 2 (two) times a day    estradiol (ESTRACE) 1 mg tablet Take 1 mg by mouth daily    ezetimibe (ZETIA) 10 mg tablet Take 1 tablet (10 mg total) by mouth daily    fenofibrate (TRICOR) 145 mg tablet Take 1 tablet (145 mg total) by mouth daily    fluticasone (FLONASE) 50 mcg/act nasal spray 2 sprays into each nostril daily    gabapentin (NEURONTIN) 100 mg capsule TAKE 2 CAPSULES BY MOUTH 2 TIMES A DAY    Icosapent Ethyl (Vascepa) 1 g CAPS Take 2 capsules (2 g total) by mouth 2 (two) times a day    lisinopril (ZESTRIL) 10 mg tablet Take 1 tablet (10 mg total) by mouth daily    metFORMIN (GLUCOPHAGE-XR) 500 mg 24 hr tablet Take 1 tablet (500 mg total) by mouth 2 (two) times a day with meals    omeprazole (PriLOSEC) 20 mg delayed release capsule Take 1 capsule (20 mg total) by mouth daily    valACYclovir (VALTREX) 1,000 mg tablet Take " "1 tablet (1,000 mg total) by mouth 2 (two) times a day for 5 days       Objective:    Ht 5' 1.5\" (1.562 m)   Wt 78 kg (172 lb)   BMI 31.97 kg/m²        Physical Exam  Yo Tanner MD    "

## 2024-01-10 NOTE — TELEPHONE ENCOUNTER
Pt has COVID.  Tested positive this Am and her symptoms started yesterday.  She wants to know if she's a candidate for plaxlovid.

## 2024-01-11 NOTE — PROGRESS NOTES
COVID-19 Outpatient Progress Note    Assessment/Plan:    Problem List Items Addressed This Visit          Other    COVID - Primary     Presents today by virtual visit for evaluation and management of recent conversion to COVID-positive state.  She and her partner both have COVID symptoms.  She currently has symptoms of congestion body aches lightheadedness fatigue.  She does have a history of diabetes and hypertension and we discussed Paxlovid which she is agreeable to take.  Current GFR reading is 80 cc/min.  She will take full dose Paxlovid for period of 5 days.  In addition I recommended vitamin C at 1000 units vitamin D 2000 units and zinc 2025 mg all 3 daily for 2-week period of time.  Patient maintains quarantine for period of 5 days followed by 5 days of strict masking.  Extra rest good nutrition good hydration all recommended.  Follow-up if symptoms worsen or fail to respond to our treatment.         Relevant Medications    nirmatrelvir & ritonavir (Paxlovid, 300/100,) tablet therapy pack      Disposition:     Discussed symptom directed medication options with patient. Discussed vitamin D, vitamin C, and/or zinc supplementation with patient.     Patient meets criteria for Paxlovid and they have been counseled appropriately regarding risks, benefits, side effects, and alternative treatment options. After discussion, patient agrees to treatment.    Possible side effects of Paxlovid?    Possible side effects of Paxlovid are:  - Liver Problems. Notify us right away if you start to experience loss of appetite, yellowing of your skin and the whites of eyes (jaundice), dark-colored urine, pale colored stools and itchy skin, stomach area (abdominal) pain.  - Resistance to HIV Medicines. If you have untreated HIV infection, Paxlovid may lead to some HIV medicines not working as well in the future.  - Other possible side effects include: altered sense of taste, diarrhea, high blood pressure, or muscle aches.    I have  spent a total time of 25 minutes on the day of the encounter for this patient including discussing diagnostic results, discussing prognosis, risks and benefits of treatment options, instructions for management, patient and family education, importance of treatment compliance, risk factor reductions, impressions, counseling/coordination of care, documenting in the medical record, reviewing/ordering tests, medicine, procedures and obtaining or reviewing history.       Encounter provider: Yo Tanner MD     Provider located at: [address] [facility]  [address] [facility] [id number]     Recent Visits  No visits were found meeting these conditions.  Showing recent visits within past 7 days and meeting all other requirements  Today's Visits  Date Type Provider Dept   01/10/24 Telemedicine Yo Tanner MD [facility]   01/10/24 Telephone Stanley Sandoval MA [facility]   Showing today's visits and meeting all other requirements  Future Appointments  No visits were found meeting these conditions.  Showing future appointments within next 150 days and meeting all other requirements     This virtual check-in was done via [employer] and patient was informed that this is a secure, HIPAA-compliant platform. She agrees to proceed.    Patient agrees to participate in a virtual check in via telephone or video visit instead of presenting to the office to address urgent/immediate medical needs. Patient is aware this is a billable service. She acknowledged consent and understanding of privacy and security of the video platform. The patient has agreed to participate and understands they can discontinue the visit at any time.    After connecting through [employer], the patient was identified by name and date of birth. Lorena Phillips was informed that this was a telemedicine visit and that the exam was being conducted confidentially over secure lines. My  "office door was closed. No one else was in the room. Lorena Phillips acknowledged consent and understanding of privacy and security of the telemedicine visit. I informed the patient that I have reviewed her record in Epic and presented the opportunity for her to ask any questions regarding the visit today. The patient agreed to participate.     Verification of patient location:  Patient is located in the following state in which I hold an active license: [place]    Subjective:   Lorena Phillips is a 53 y.o. female who is concerned about COVID-19. Patient's symptoms include fatigue, malaise, nasal congestion, myalgias and headache.     - Date of symptom onset: 1/9/2024      COVID-19 vaccination status: Fully vaccinated with booster    Exposure:   Contact with a person who is under investigation (PUI) for or who is positive for COVID-19 within the last 14 days?: Yes    Hospitalized recently for fever and/or lower respiratory symptoms?: No      Currently a healthcare worker that is involved in direct patient care?: No      Works in a special setting where the risk of COVID-19 transmission may be high? (this may include long-term care, correctional and shelter facilities; homeless shelters; assisted-living facilities and group homes.): No      Resident in a special setting where the risk of COVID-19 transmission may be high? (this may include long-term care, correctional and shelter facilities; homeless shelters; assisted-living facilities and group homes.): No      No results found for: \"SARSCOV2\", \"[id number]\", \"SARSCORONAVI\", \"CORONAVIRUSR\", \"SARSCOVAG\", \"SARSCOVAGH\"    Review of Systems   Constitutional:  Positive for fatigue.   HENT:  Positive for congestion.    Musculoskeletal:  Positive for myalgias.   Neurological:  Positive for headaches.     Current Outpatient Medications on File Prior to Visit   Medication Sig    bromocriptine (PARLODEL) 2.5 mg tablet Take 1 tablet (2.5 mg total) by mouth daily    " "dicyclomine (BENTYL) 10 mg capsule Take 1 capsule (10 mg total) by mouth 2 (two) times a day    estradiol (ESTRACE) 1 mg tablet Take 1 mg by mouth daily    ezetimibe (ZETIA) 10 mg tablet Take 1 tablet (10 mg total) by mouth daily    fenofibrate (TRICOR) 145 mg tablet Take 1 tablet (145 mg total) by mouth daily    fluticasone (FLONASE) 50 mcg/act nasal spray 2 sprays into each nostril daily    gabapentin (NEURONTIN) 100 mg capsule TAKE 2 CAPSULES BY MOUTH 2 TIMES A DAY    Icosapent Ethyl (Vascepa) 1 g CAPS Take 2 capsules (2 g total) by mouth 2 (two) times a day    lisinopril (ZESTRIL) 10 mg tablet Take 1 tablet (10 mg total) by mouth daily    metFORMIN (GLUCOPHAGE-XR) 500 mg 24 hr tablet Take 1 tablet (500 mg total) by mouth 2 (two) times a day with meals    omeprazole (PriLOSEC) 20 mg delayed release capsule Take 1 capsule (20 mg total) by mouth daily    valACYclovir (VALTREX) 1,000 mg tablet Take 1 tablet (1,000 mg total) by mouth 2 (two) times a day for 5 days       Objective:    Ht 5' 1.5\" (1.562 m)   Wt 78 kg (172 lb)   BMI 31.97 kg/m²        Physical Exam  Constitutional:       Comments: By virtual examination the patient is alert and oriented x 3 in no acute respiratory distress skin color slightly flushed.       Yo Tanner MD    "

## 2024-01-11 NOTE — ASSESSMENT & PLAN NOTE
Presents today by virtual visit for evaluation and management of recent conversion to COVID-positive state.  She and her partner both have COVID symptoms.  She currently has symptoms of congestion body aches lightheadedness fatigue.  She does have a history of diabetes and hypertension and we discussed Paxlovid which she is agreeable to take.  Current GFR reading is 80 cc/min.  She will take full dose Paxlovid for period of 5 days.  In addition I recommended vitamin C at 1000 units vitamin D 2000 units and zinc 2025 mg all 3 daily for 2-week period of time.  Patient maintains quarantine for period of 5 days followed by 5 days of strict masking.  Extra rest good nutrition good hydration all recommended.  Follow-up if symptoms worsen or fail to respond to our treatment.

## 2024-01-21 ENCOUNTER — APPOINTMENT (OUTPATIENT)
Dept: RADIOLOGY | Age: 54
End: 2024-01-21
Attending: NURSE PRACTITIONER
Payer: COMMERCIAL

## 2024-01-21 ENCOUNTER — OFFICE VISIT (OUTPATIENT)
Dept: URGENT CARE | Age: 54
End: 2024-01-21
Payer: COMMERCIAL

## 2024-01-21 DIAGNOSIS — S06.0X0A CONCUSSION WITHOUT LOSS OF CONSCIOUSNESS, INITIAL ENCOUNTER: Primary | ICD-10-CM

## 2024-01-21 DIAGNOSIS — S40.011A CONTUSION OF RIGHT SHOULDER, INITIAL ENCOUNTER: ICD-10-CM
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
## 2024-01-21 DIAGNOSIS — S30.0XXA CONTUSION OF BUTTOCK, INITIAL ENCOUNTER: ICD-10-CM

## 2024-01-21 DIAGNOSIS — T07.XXXA ABRASIONS OF MULTIPLE SITES: ICD-10-CM

## 2024-01-21 DIAGNOSIS — S80.01XA CONTUSION OF RIGHT KNEE, INITIAL ENCOUNTER: ICD-10-CM

## 2024-01-21 PROCEDURE — 73030 X-RAY EXAM OF SHOULDER: CPT

## 2024-01-21 PROCEDURE — 99214 OFFICE O/P EST MOD 30 MIN: CPT | Performed by: NURSE PRACTITIONER

## 2024-01-21 NOTE — LETTER
January 21, 2024     Patient: Lorena Phillips   YOB: 1970   Date of Visit: 1/21/2024       To Whom It May Concern:    It is my medical opinion that Lorena Phillips may return to work on 1/23/2024 .    If you have any questions or concerns, please don't hesitate to call.         Sincerely,        CESAR Resendiz    CC: No Recipients

## 2024-01-21 NOTE — PATIENT INSTRUCTIONS
Your xray was preliminarily read by your provider.  A radiologist will read the xray and you will be notified if it is abnormal.    You are to place cool compresses over the right eye abrasion. You may apply plain bacitracin if needed.   You are to do same to right knee.  Take tylenol or motrin for pain.  You are to see orthopedics if symptoms continue from right shoulder  Follow up with your PCP in 3-5 days  Go to the ED if symptoms worsen    Monitor for any type of neuro changes, behavior changes, vomiting, dizziness, eye disturbance - go to the ED if symptoms occur.    Rest, light diet today    stay hydrated

## 2024-01-21 NOTE — PROGRESS NOTES
"  [facility]        NAME: Lorena Phillips is a 53 y.o. female  : 1970    MRN: [id number]  DATE: 2024  TIME: 12:28 PM    Assessment and Plan   Concussion without loss of consciousness, initial encounter [S06.0X0A]  1. Concussion without loss of consciousness, initial encounter        2. Contusion of right shoulder, initial encounter  XR shoulder 2+ vw right      3. Contusion of right knee, initial encounter        4. Contusion of buttock, initial encounter      right      5. Abrasions of multiple sites        6. Fall, initial encounter  XR shoulder 2+ vw right            Patient Instructions       Follow up with PCP in 3-5 days.  Proceed to  ER if symptoms worsen.    Your xray was preliminarily read by your provider.  A radiologist will read the xray and you will be notified if it is abnormal.    You are to place cool compresses over the right eye abrasion. You may apply plain bacitracin if needed.   You are to do same to right knee.  Take tylenol or motrin for pain.  You are to see orthopedics if symptoms continue from right shoulder  Follow up with your PCP in 3-5 days  Go to the ED if symptoms worsen    Monitor for any type of neuro changes, behavior changes, vomiting, dizziness, eye disturbance - go to the ED if symptoms occur.    Rest, light diet today    stay hydrated       Chief Complaint     Chief Complaint   Patient presents with    Fall     Fell over a wire at home, Fell into the plant stand, Hot head on [place] cabinet, NO LOC. Feels wuzzy and off, a little nausea. Shoulder is in pain mainly. Can't raise it or put pressure on it.          History of Present Illness       This is a 53 year old female who states fell over a computer wire this am and fell into a wooden plant stand and hit her head on the china cabinet. Denies any LOC, anticoagulants or asa use.  She states she feels nauseated, \"woozy\".  She states her right shoulder hit the cabinet and is the most painful of her " injuries.  She states she hit her right buttock and right knee.  Knee has an abrasion.  She states buttock hurts with bending over and putting pants on.  Knee hurts with walking.   Right shoulder hurts with all movement.  PMH is listed.         Review of Systems   Review of Systems   Constitutional: Negative.    HENT: Negative.     Eyes: Negative.    Respiratory: Negative.     Cardiovascular: Negative.    Gastrointestinal: Negative.    Endocrine: Negative.    Genitourinary: Negative.    Musculoskeletal:         Right shoulder, knee and buttock pain    Skin:  Positive for wound.   Allergic/Immunologic: Negative.    Neurological: Negative.    Hematological: Negative.    Psychiatric/Behavioral: Negative.           Current Medications       Current Outpatient Medications:     bromocriptine (PARLODEL) 2.5 mg tablet, Take 1 tablet (2.5 mg total) by mouth daily, Disp: 90 tablet, Rfl: 3    dicyclomine (BENTYL) 10 mg capsule, Take 1 capsule (10 mg total) by mouth 2 (two) times a day, Disp: 180 capsule, Rfl: 3    estradiol (ESTRACE) 1 mg tablet, Take 1 mg by mouth daily, Disp: , Rfl:     ezetimibe (ZETIA) 10 mg tablet, Take 1 tablet (10 mg total) by mouth daily, Disp: 90 tablet, Rfl: 3    fenofibrate (TRICOR) 145 mg tablet, Take 1 tablet (145 mg total) by mouth daily, Disp: 90 tablet, Rfl: 3    fluticasone (FLONASE) 50 mcg/act nasal spray, 2 sprays into each nostril daily, Disp: 11.1 mL, Rfl: 0    gabapentin (NEURONTIN) 100 mg capsule, TAKE 2 CAPSULES BY MOUTH 2 TIMES A DAY, Disp: 240 capsule, Rfl: 0    Icosapent Ethyl (Vascepa) 1 g CAPS, Take 2 capsules (2 g total) by mouth 2 (two) times a day, Disp: 360 capsule, Rfl: 3    lisinopril (ZESTRIL) 10 mg tablet, Take 1 tablet (10 mg total) by mouth daily, Disp: 90 tablet, Rfl: 3    metFORMIN (GLUCOPHAGE-XR) 500 mg 24 hr tablet, Take 1 tablet (500 mg total) by mouth 2 (two) times a day with meals, Disp: 180 tablet, Rfl: 3    omeprazole (PriLOSEC) 20 mg delayed release capsule,  Take 1 capsule (20 mg total) by mouth daily, Disp: 90 capsule, Rfl: 2    valACYclovir (VALTREX) 1,000 mg tablet, Take 1 tablet (1,000 mg total) by mouth 2 (two) times a day for 5 days, Disp: 10 tablet, Rfl: 0    Current Allergies     Allergies as of 01/21/2024 - Reviewed 01/21/2024   Allergen Reaction Noted    Statins Irritability, Lightheadedness, and Other (See Comments) 05/03/2023            The following portions of the patient's history were reviewed and updated as appropriate: allergies, current medications, past family history, past medical history, past social history, past surgical history and problem list.     History reviewed. No pertinent past medical history.    Past Surgical History:   Procedure Laterality Date    HYSTERECTOMY  2006    still has 1 ovary-?side       Family History   Problem Relation Age of Onset    No Known Problems Mother     Lymphoma Father     No Known Problems Sister     Stomach cancer Maternal Grandmother 48    Lung cancer Maternal Grandfather 80    No Known Problems Paternal Grandmother     No Known Problems Paternal Grandfather     No Known Problems Maternal Aunt     No Known Problems Maternal Aunt     No Known Problems Paternal Aunt     No Known Problems Paternal Aunt     Leukemia Paternal Uncle 39    Lung cancer Paternal Uncle 75         Medications have been verified.        Objective   There were no vitals taken for this visit.  No LMP recorded. Patient has had a hysterectomy.       Physical Exam     Physical Exam  Vitals and nursing note reviewed.   Constitutional:       General: She is not in acute distress.     Appearance: Normal appearance. She is normal weight. She is not ill-appearing, toxic-appearing or diaphoretic.   HENT:      Head:        Nose: Nose normal.      Mouth/Throat:      Mouth: Mucous membranes are moist.   Eyes:      General:         Right eye: No discharge.         Left eye: No discharge.      Extraocular Movements: Extraocular movements intact.       Pupils: Pupils are equal, round, and reactive to light.   Cardiovascular:      Rate and Rhythm: Normal rate and regular rhythm.      Pulses: Normal pulses.      Heart sounds: Normal heart sounds. No murmur heard.  Pulmonary:      Effort: Pulmonary effort is normal. No respiratory distress.      Breath sounds: Normal breath sounds. No stridor. No wheezing, rhonchi or rales.   Musculoskeletal:         General: Swelling, tenderness and signs of injury present. No deformity. Normal range of motion.      Cervical back: Normal range of motion and neck supple. No rigidity or tenderness.      Comments: Right shoulder:  no edema, deformity, bruising.  C/o pain with all range of motion. No clicking with movement.   Muscle strength 5/5   Right buttock - no edema, deformity, bruising or abrasions.   TTP over medial aspect of buttock.   No trochanter tenderness and no shortening or RLE.     Right knee:   slight edema, no deformity or bruising.    + abrasion (2) noted.  No clicking with movement. FROM.  TTP at lateral knee where abrasions are.  Muscle strength 5/5. Able to bear weight.  No laxity.    All NVI    Skin:     General: Skin is warm and dry.      Capillary Refill: Capillary refill takes less than 2 seconds.          Neurological:      General: No focal deficit present.      Mental Status: She is alert and oriented to person, place, and time.      GCS: GCS eye subscore is 4. GCS verbal subscore is 5. GCS motor subscore is 6.      Cranial Nerves: Cranial nerves 2-12 are intact.      Sensory: Sensation is intact.      Motor: Motor function is intact.      Coordination: Coordination is intact.      Gait: Gait is intact.   Psychiatric:         Mood and Affect: Mood normal.         Behavior: Behavior normal.         Thought Content: Thought content normal.         Judgment: Judgment normal.           Preliminary reading right shoulder xray  No acute osseous abnormality noted  Waiting on rad read

## 2024-01-22 DIAGNOSIS — Z00.6 ENCOUNTER FOR EXAMINATION FOR NORMAL COMPARISON OR CONTROL IN CLINICAL RESEARCH PROGRAM: ICD-10-CM

## 2024-01-23 DIAGNOSIS — E11.40 TYPE 2 DIABETES MELLITUS WITH DIABETIC NEUROPATHY, WITHOUT LONG-TERM CURRENT USE OF INSULIN (HCC): ICD-10-CM

## 2024-01-23 RX ORDER — METFORMIN HYDROCHLORIDE 500 MG/1
500 TABLET, EXTENDED RELEASE ORAL
Qty: 90 TABLET | Refills: 3 | Status: SHIPPED | OUTPATIENT
Start: 2024-01-23

## 2024-01-23 NOTE — PROGRESS NOTES
She notified us that blood sugars are running low some mornings will decrease metformin from 500 mg twice a day to once a day.

## 2024-02-09 ENCOUNTER — APPOINTMENT (OUTPATIENT)
Dept: LAB | Age: 54
End: 2024-02-09
Payer: COMMERCIAL

## 2024-02-09 DIAGNOSIS — E11.40 TYPE 2 DIABETES MELLITUS WITH DIABETIC NEUROPATHY, WITHOUT LONG-TERM CURRENT USE OF INSULIN (HCC): ICD-10-CM

## 2024-02-09 DIAGNOSIS — E78.5 HYPERLIPIDEMIA, UNSPECIFIED HYPERLIPIDEMIA TYPE: ICD-10-CM

## 2024-02-09 DIAGNOSIS — Z00.6 ENCOUNTER FOR EXAMINATION FOR NORMAL COMPARISON OR CONTROL IN CLINICAL RESEARCH PROGRAM: ICD-10-CM

## 2024-02-09 DIAGNOSIS — Z13.0 SCREENING FOR DEFICIENCY ANEMIA: ICD-10-CM

## 2024-02-09 LAB
ALBUMIN SERPL BCP-MCNC: 4.2 G/DL (ref 3.5–5)
ALP SERPL-CCNC: 31 U/L (ref 34–104)
ALT SERPL W P-5'-P-CCNC: 9 U/L (ref 7–52)
ANION GAP SERPL CALCULATED.3IONS-SCNC: 10 MMOL/L
AST SERPL W P-5'-P-CCNC: 13 U/L (ref 13–39)
BASOPHILS # BLD AUTO: 0.04 THOUSANDS/ÂΜL (ref 0–0.1)
BASOPHILS NFR BLD AUTO: 1 % (ref 0–1)
BILIRUB SERPL-MCNC: 0.35 MG/DL (ref 0.2–1)
BILIRUB UR QL STRIP: NEGATIVE
BUN SERPL-MCNC: 18 MG/DL (ref 5–25)
CALCIUM SERPL-MCNC: 8.9 MG/DL (ref 8.4–10.2)
CHLORIDE SERPL-SCNC: 103 MMOL/L (ref 96–108)
CHOLEST SERPL-MCNC: 174 MG/DL
CLARITY UR: CLEAR
CO2 SERPL-SCNC: 27 MMOL/L (ref 21–32)
COLOR UR: NORMAL
CREAT SERPL-MCNC: 0.79 MG/DL (ref 0.6–1.3)
EOSINOPHIL # BLD AUTO: 0.23 THOUSAND/ÂΜL (ref 0–0.61)
EOSINOPHIL NFR BLD AUTO: 4 % (ref 0–6)
ERYTHROCYTE [DISTWIDTH] IN BLOOD BY AUTOMATED COUNT: 13 % (ref 11.6–15.1)
GFR SERPL CREATININE-BSD FRML MDRD: 84 ML/MIN/1.73SQ M
GLUCOSE P FAST SERPL-MCNC: 89 MG/DL (ref 65–99)
GLUCOSE UR STRIP-MCNC: NEGATIVE MG/DL
HCT VFR BLD AUTO: 39.5 % (ref 34.8–46.1)
HDLC SERPL-MCNC: 48 MG/DL
HGB BLD-MCNC: 13.3 G/DL (ref 11.5–15.4)
HGB UR QL STRIP.AUTO: NEGATIVE
IMM GRANULOCYTES # BLD AUTO: 0.02 THOUSAND/UL (ref 0–0.2)
IMM GRANULOCYTES NFR BLD AUTO: 0 % (ref 0–2)
KETONES UR STRIP-MCNC: NEGATIVE MG/DL
LDLC SERPL CALC-MCNC: 92 MG/DL (ref 0–100)
LEUKOCYTE ESTERASE UR QL STRIP: NEGATIVE
LYMPHOCYTES # BLD AUTO: 2.83 THOUSANDS/ÂΜL (ref 0.6–4.47)
LYMPHOCYTES NFR BLD AUTO: 50 % (ref 14–44)
MCH RBC QN AUTO: 29.4 PG (ref 26.8–34.3)
MCHC RBC AUTO-ENTMCNC: 33.7 G/DL (ref 31.4–37.4)
MCV RBC AUTO: 87 FL (ref 82–98)
MONOCYTES # BLD AUTO: 0.35 THOUSAND/ÂΜL (ref 0.17–1.22)
MONOCYTES NFR BLD AUTO: 6 % (ref 4–12)
NEUTROPHILS # BLD AUTO: 2.2 THOUSANDS/ÂΜL (ref 1.85–7.62)
NEUTS SEG NFR BLD AUTO: 39 % (ref 43–75)
NITRITE UR QL STRIP: NEGATIVE
NONHDLC SERPL-MCNC: 126 MG/DL
NRBC BLD AUTO-RTO: 0 /100 WBCS
PH UR STRIP.AUTO: 7 [PH]
PLATELET # BLD AUTO: 194 THOUSANDS/UL (ref 149–390)
PMV BLD AUTO: 10.3 FL (ref 8.9–12.7)
POTASSIUM SERPL-SCNC: 4.2 MMOL/L (ref 3.5–5.3)
PROT SERPL-MCNC: 6.8 G/DL (ref 6.4–8.4)
PROT UR STRIP-MCNC: NEGATIVE MG/DL
RBC # BLD AUTO: 4.52 MILLION/UL (ref 3.81–5.12)
SODIUM SERPL-SCNC: 140 MMOL/L (ref 135–147)
SP GR UR STRIP.AUTO: 1.02 (ref 1–1.03)
TRIGL SERPL-MCNC: 172 MG/DL
UROBILINOGEN UR STRIP-ACNC: <2 MG/DL
WBC # BLD AUTO: 5.67 THOUSAND/UL (ref 4.31–10.16)

## 2024-02-09 PROCEDURE — 80053 COMPREHEN METABOLIC PANEL: CPT

## 2024-02-09 PROCEDURE — 83036 HEMOGLOBIN GLYCOSYLATED A1C: CPT

## 2024-02-09 PROCEDURE — 85025 COMPLETE CBC W/AUTO DIFF WBC: CPT

## 2024-02-09 PROCEDURE — 81003 URINALYSIS AUTO W/O SCOPE: CPT

## 2024-02-09 PROCEDURE — 80061 LIPID PANEL: CPT

## 2024-02-09 PROCEDURE — 36415 COLL VENOUS BLD VENIPUNCTURE: CPT

## 2024-02-10 LAB
EST. AVERAGE GLUCOSE BLD GHB EST-MCNC: 128 MG/DL
HBA1C MFR BLD: 6.1 %

## 2024-02-23 ENCOUNTER — OFFICE VISIT (OUTPATIENT)
Dept: INTERNAL MEDICINE CLINIC | Facility: CLINIC | Age: 54
End: 2024-02-23
Payer: COMMERCIAL

## 2024-02-23 VITALS
OXYGEN SATURATION: 99 % | SYSTOLIC BLOOD PRESSURE: 118 MMHG | WEIGHT: 179 LBS | HEART RATE: 72 BPM | BODY MASS INDEX: 32.94 KG/M2 | RESPIRATION RATE: 16 BRPM | HEIGHT: 62 IN | DIASTOLIC BLOOD PRESSURE: 64 MMHG

## 2024-02-23 DIAGNOSIS — I10 PRIMARY HYPERTENSION: ICD-10-CM

## 2024-02-23 DIAGNOSIS — E78.5 HYPERLIPIDEMIA, UNSPECIFIED HYPERLIPIDEMIA TYPE: ICD-10-CM

## 2024-02-23 DIAGNOSIS — E78.1 HYPERTRIGLYCERIDEMIA: ICD-10-CM

## 2024-02-23 DIAGNOSIS — K29.70 GASTRITIS WITHOUT BLEEDING, UNSPECIFIED CHRONICITY, UNSPECIFIED GASTRITIS TYPE: ICD-10-CM

## 2024-02-23 DIAGNOSIS — E11.40 TYPE 2 DIABETES MELLITUS WITH DIABETIC NEUROPATHY, WITHOUT LONG-TERM CURRENT USE OF INSULIN (HCC): Primary | ICD-10-CM

## 2024-02-23 PROBLEM — U07.1 COVID: Status: RESOLVED | Noted: 2024-01-10 | Resolved: 2024-02-23

## 2024-02-23 PROCEDURE — 99396 PREV VISIT EST AGE 40-64: CPT | Performed by: INTERNAL MEDICINE

## 2024-02-23 PROCEDURE — 99214 OFFICE O/P EST MOD 30 MIN: CPT | Performed by: INTERNAL MEDICINE

## 2024-02-23 NOTE — ASSESSMENT & PLAN NOTE
Lipid profile reviewed with the patient today recommend continuation of low-cholesterol diet as well as continuation of Setia at 10 mg on a daily basis.

## 2024-02-23 NOTE — ASSESSMENT & PLAN NOTE
Hypertension assessment today confirms adequate control recommend continuation of lisinopril at 10 mg on a daily basis.

## 2024-02-23 NOTE — PROGRESS NOTES
Name: Lorena Phillips      : 1970      MRN: [id number]  Encounter Provider: Yo Tanner MD  Encounter Date: 2024   Encounter department: [facility]    Assessment & Plan     1. Type 2 diabetes mellitus with diabetic neuropathy, without long-term current use of insulin (HCC)  Assessment & Plan:  Current diabetic control remains in good range hemoglobin A1c is 6.1%.  Recommend continuation of balanced diabetic diet regular exercise weight reduction and continuation of metformin 500 mg daily with breakfast  Lab Results   Component Value Date    HGBA1C 6.1 (H) 2024       Orders:  -     Albumin / creatinine urine ratio; Future; Expected date: 2024  -     Comprehensive metabolic panel; Future; Expected date: 2024  -     Hemoglobin A1C; Future; Expected date: 2024    2. Gastritis without bleeding, unspecified chronicity, unspecified gastritis type  Assessment & Plan:  Stridor symptoms are currently stable recommend continuation of omeprazole 20 mg on a daily basis.      3. Primary hypertension  Assessment & Plan:  Hypertension assessment today confirms adequate control recommend continuation of lisinopril at 10 mg on a daily basis.      4. Hyperlipidemia, unspecified hyperlipidemia type  Assessment & Plan:  Lipid profile reviewed with the patient today recommend continuation of low-cholesterol diet as well as continuation of Setia at 10 mg on a daily basis.      5. Hypertriglyceridemia  Assessment & Plan:  Triglyceride profile reviewed recommend continued care and consumption of concentrated sugars and excessive carbohydrates and also continue Tricor at 145 mg daily.             Subjective      This 53-year-old female patient presents today for an annual physical examination and review of her recent blood work.  She recently experienced a COVID-19 infection and has recovered nicely.    She has been exercising regularly by walking.  Today's visit we  "have evaluated her type 2 diabetes hyperlipidemia hypertriglyceridemia as well as hypertension.      Review of Systems   Gastrointestinal:  Positive for constipation and diarrhea.   All other systems reviewed and are negative.      Current Outpatient Medications on File Prior to Visit   Medication Sig    bromocriptine (PARLODEL) 2.5 mg tablet Take 1 tablet (2.5 mg total) by mouth daily    dicyclomine (BENTYL) 10 mg capsule Take 1 capsule (10 mg total) by mouth 2 (two) times a day    estradiol (ESTRACE) 1 mg tablet Take 1 mg by mouth daily    ezetimibe (ZETIA) 10 mg tablet Take 1 tablet (10 mg total) by mouth daily    fenofibrate (TRICOR) 145 mg tablet Take 1 tablet (145 mg total) by mouth daily    fluticasone (FLONASE) 50 mcg/act nasal spray 2 sprays into each nostril daily    gabapentin (NEURONTIN) 100 mg capsule TAKE 2 CAPSULES BY MOUTH 2 TIMES A DAY    Icosapent Ethyl (Vascepa) 1 g CAPS Take 2 capsules (2 g total) by mouth 2 (two) times a day    lisinopril (ZESTRIL) 10 mg tablet Take 1 tablet (10 mg total) by mouth daily    metFORMIN (GLUCOPHAGE-XR) 500 mg 24 hr tablet Take 1 tablet (500 mg total) by mouth daily with breakfast    omeprazole (PriLOSEC) 20 mg delayed release capsule Take 1 capsule (20 mg total) by mouth daily    valACYclovir (VALTREX) 1,000 mg tablet Take 1 tablet (1,000 mg total) by mouth 2 (two) times a day for 5 days       Objective     /64   Pulse 72   Resp 16   Ht 5' 1.5\" (1.562 m)   Wt 81.2 kg (179 lb)   SpO2 99%   BMI 33.27 kg/m²     Physical Exam  Constitutional:       General: She is not in acute distress.     Appearance: Normal appearance. She is not ill-appearing.   HENT:      Head: Normocephalic.      Right Ear: Tympanic membrane, ear canal and external ear normal.      Left Ear: Tympanic membrane, ear canal and external ear normal.      Nose: Nose normal.      Mouth/Throat:      Mouth: Mucous membranes are moist.      Pharynx: Oropharynx is clear.   Eyes:      Extraocular " Movements: Extraocular movements intact.      Conjunctiva/sclera: Conjunctivae normal.      Pupils: Pupils are equal, round, and reactive to light.   Neck:      Vascular: No carotid bruit.   Cardiovascular:      Rate and Rhythm: Regular rhythm.      Heart sounds: Normal heart sounds.   Pulmonary:      Effort: Pulmonary effort is normal. No respiratory distress.      Breath sounds: No wheezing, rhonchi or rales.   Abdominal:      General: Bowel sounds are normal. There is no distension.      Palpations: There is no mass.      Tenderness: There is no abdominal tenderness. There is no guarding.   Musculoskeletal:         General: No swelling or tenderness.      Cervical back: Normal range of motion and neck supple. No rigidity or tenderness.      Right lower leg: No edema.      Left lower leg: No edema.   Lymphadenopathy:      Cervical: No cervical adenopathy.   Skin:     Coloration: Skin is not jaundiced or pale.   Neurological:      Mental Status: She is alert. Mental status is at baseline.   Psychiatric:         Mood and Affect: Mood normal.         Behavior: Behavior normal.         Thought Content: Thought content normal.         Judgment: Judgment normal.       Yo Tanner MD

## 2024-02-23 NOTE — ASSESSMENT & PLAN NOTE
Current diabetic control remains in good range hemoglobin A1c is 6.1%.  Recommend continuation of balanced diabetic diet regular exercise weight reduction and continuation of metformin 500 mg daily with breakfast  Lab Results   Component Value Date    HGBA1C 6.1 (H) 02/09/2024

## 2024-02-23 NOTE — ASSESSMENT & PLAN NOTE
Stridor symptoms are currently stable recommend continuation of omeprazole 20 mg on a daily basis.

## 2024-02-23 NOTE — ASSESSMENT & PLAN NOTE
Triglyceride profile reviewed recommend continued care and consumption of concentrated sugars and excessive carbohydrates and also continue Tricor at 145 mg daily.

## 2024-03-04 LAB
APOB+LDLR+PCSK9 GENE MUT ANL BLD/T: NOT DETECTED
BRCA1+BRCA2 DEL+DUP + FULL MUT ANL BLD/T: NOT DETECTED
MLH1+MSH2+MSH6+PMS2 GN DEL+DUP+FUL M: NOT DETECTED

## 2024-03-18 NOTE — PROGRESS NOTES
Having low blood pressure readings at home on lisinopril 10 mg daily will hold medication and observe

## 2024-04-04 ENCOUNTER — OFFICE VISIT (OUTPATIENT)
Dept: INTERNAL MEDICINE CLINIC | Facility: CLINIC | Age: 54
End: 2024-04-04
Payer: COMMERCIAL

## 2024-04-04 VITALS
DIASTOLIC BLOOD PRESSURE: 76 MMHG | WEIGHT: 182 LBS | SYSTOLIC BLOOD PRESSURE: 122 MMHG | HEIGHT: 62 IN | BODY MASS INDEX: 33.49 KG/M2 | OXYGEN SATURATION: 97 % | HEART RATE: 60 BPM

## 2024-04-04 DIAGNOSIS — E11.40 TYPE 2 DIABETES MELLITUS WITH DIABETIC NEUROPATHY, WITHOUT LONG-TERM CURRENT USE OF INSULIN (HCC): ICD-10-CM

## 2024-04-04 DIAGNOSIS — R94.31 ABNORMAL ELECTROCARDIOGRAM: ICD-10-CM

## 2024-04-04 DIAGNOSIS — R00.1 BRADYCARDIA: Primary | ICD-10-CM

## 2024-04-04 PROBLEM — I10 PRIMARY HYPERTENSION: Status: RESOLVED | Noted: 2023-05-03 | Resolved: 2024-04-04

## 2024-04-04 PROCEDURE — 93000 ELECTROCARDIOGRAM COMPLETE: CPT | Performed by: INTERNAL MEDICINE

## 2024-04-04 PROCEDURE — 99214 OFFICE O/P EST MOD 30 MIN: CPT | Performed by: INTERNAL MEDICINE

## 2024-04-04 NOTE — ASSESSMENT & PLAN NOTE
Recent decrease in heart rate by 10 to 20 bpm electrocardiogram today shows no heart block.  It is indicating possible previous anterior event with poor R wave progression over the anterior leads.  No previous electrocardiogram is available for comparison.  She did have a stress test several years ago reported to be normal.  Plan is for referral to cardiology for further evaluation.

## 2024-04-04 NOTE — PROGRESS NOTES
Name: Lorena Phillips      : 1970      MRN: [id number]  Encounter Provider: Yo Tanner MD  Encounter Date: 2024   Encounter department: [facility] INTERNAL MEDICINE    Assessment & Plan     1. Bradycardia  Assessment & Plan:  Recent decrease in heart rate by 10 to 20 bpm electrocardiogram today shows no heart block.  It is indicating possible previous anterior event with poor R wave progression over the anterior leads.  No previous electrocardiogram is available for comparison.  She did have a stress test several years ago reported to be normal.  Plan is for referral to cardiology for further evaluation.    Orders:  -     POCT ECG  -     Ambulatory Referral to Cardiology; Future    2. Type 2 diabetes mellitus with diabetic neuropathy, without long-term current use of insulin ([facility])  Assessment & Plan:  Home glucose readings reviewed today remain in excellent range averaging in the 90s to low 100s.  Continue current diet and metformin at 500 mg daily.  Lab Results   Component Value Date    HGBA1C 6.1 (H) 2024         3. Abnormal electrocardiogram  Assessment & Plan:  Relative bradycardia but maintaining adequate blood pressure on today's evaluation patient does have abnormalities across the anterior leads with poor R wave progression suggesting possible previous event to the anterior edge of surface of the heart.  Referral to cardiology             Subjective      This 54-year-old female patient presents today for follow-up of blood pressure fluctuations and heart rate decreased.  He has a history of prior elevation in blood pressure readings and was placed on lisinopril.  Most recently he notified me of low blood pressures at home on .  We discontinued the lisinopril at that time.  She returns today for follow-up.  She has a home blood pressure monitoring system and returns with average blood pressure readings of 110/72.  Also notes that her heart rate is averaging 51  "bpm on her home monitor.  Previous heart rates were running in the 70 range.  She is currently on no medications that would cause a decrease in heart rate.  Patient reports no symptoms of chest pain or palpitations.      Review of Systems   All other systems reviewed and are negative.      Current Outpatient Medications on File Prior to Visit   Medication Sig    bromocriptine (PARLODEL) 2.5 mg tablet Take 1 tablet (2.5 mg total) by mouth daily    dicyclomine (BENTYL) 10 mg capsule Take 1 capsule (10 mg total) by mouth 2 (two) times a day    estradiol (ESTRACE) 1 mg tablet Take 1 mg by mouth daily    ezetimibe (ZETIA) 10 mg tablet Take 1 tablet (10 mg total) by mouth daily    Icosapent Ethyl (Vascepa) 1 g CAPS Take 2 capsules (2 g total) by mouth 2 (two) times a day    metFORMIN (GLUCOPHAGE-XR) 500 mg 24 hr tablet Take 1 tablet (500 mg total) by mouth daily with breakfast    omeprazole (PriLOSEC) 20 mg delayed release capsule Take 1 capsule (20 mg total) by mouth daily    fenofibrate (TRICOR) 145 mg tablet Take 1 tablet (145 mg total) by mouth daily    gabapentin (NEURONTIN) 100 mg capsule TAKE 2 CAPSULES BY MOUTH 2 TIMES A DAY    valACYclovir (VALTREX) 1,000 mg tablet Take 1 tablet (1,000 mg total) by mouth 2 (two) times a day for 5 days    [DISCONTINUED] fluticasone (FLONASE) 50 mcg/act nasal spray 2 sprays into each nostril daily       Objective     /76   Pulse 60   Ht 5' 1.5\" (1.562 m)   Wt 82.6 kg (182 lb)   SpO2 97%   BMI 33.83 kg/m²     Physical Exam  Constitutional:       General: She is not in acute distress.     Appearance: Normal appearance. She is not ill-appearing.   HENT:      Head: Normocephalic.   Eyes:      Pupils: Pupils are equal, round, and reactive to light.   Cardiovascular:      Rate and Rhythm: Regular rhythm. Bradycardia present.      Heart sounds: Normal heart sounds.   Pulmonary:      Effort: No respiratory distress.      Breath sounds: No wheezing, rhonchi or rales. "   Musculoskeletal:      Right lower leg: No edema.      Left lower leg: No edema.   Neurological:      Mental Status: She is alert. Mental status is at baseline.   Psychiatric:         Mood and Affect: Mood normal.         Behavior: Behavior normal.         Thought Content: Thought content normal.         Judgment: Judgment normal.       Yo Tanner MD

## 2024-04-04 NOTE — ASSESSMENT & PLAN NOTE
Home glucose readings reviewed today remain in excellent range averaging in the 90s to low 100s.  Continue current diet and metformin at 500 mg daily.  Lab Results   Component Value Date    HGBA1C 6.1 (H) 02/09/2024

## 2024-04-04 NOTE — ASSESSMENT & PLAN NOTE
Relative bradycardia but maintaining adequate blood pressure on today's evaluation patient does have abnormalities across the anterior leads with poor R wave progression suggesting possible previous event to the anterior edge of surface of the heart.  Referral to cardiology

## 2024-04-10 ENCOUNTER — CONSULT (OUTPATIENT)
Dept: CARDIOLOGY CLINIC | Facility: CLINIC | Age: 54
End: 2024-04-10

## 2024-04-10 VITALS
DIASTOLIC BLOOD PRESSURE: 78 MMHG | WEIGHT: 181.8 LBS | HEART RATE: 58 BPM | SYSTOLIC BLOOD PRESSURE: 138 MMHG | OXYGEN SATURATION: 99 % | BODY MASS INDEX: 33.45 KG/M2 | HEIGHT: 62 IN

## 2024-04-10 DIAGNOSIS — R00.1 BRADYCARDIA: ICD-10-CM

## 2024-04-10 RX ORDER — GABAPENTIN 300 MG/1
300 CAPSULE ORAL DAILY
COMMUNITY

## 2024-04-10 NOTE — PROGRESS NOTES
Consultation - Cardiology   Lorena Phillips 54 y.o. female MRN: [id number]  Unit/Bed#:  Encounter: [id number]  Physician Requesting Consult: No att. providers found  Reason for Consult / Principal Problem: Bradycardia    Assessment:  Bradycardia    Plan:  She is not having symptomatic bradycardia. Her lower HR is not uncommonly seen with significant weight loss.  I will recheck an EST to evaluate her HR response to exercise. I do not feel that she needs a holter or extended monitoring.  If her stress test is OK, then no further cardiac testing is needed.    History of Present Illness     HPI: Lorena Phillips is a 54 y.o. year old female who denies any past cardiac history. She has a history of HTN. She has DM and hypercholesterolemia.   She has lost over 50 lbs in the past year.    She is active and walks about 2 1/2 miles regularly. She says that her HR gets to around 90 to 100 BPM with her walking.    Her resting HR is around 50 BPM.    She had a stress echo in 2017 and 2021 - no ischemia, appropriate HR response to exercise.    She c/o some fatigue. No dizziness, no syncope.    BP is much better since losing weight. She is no longer on BP medications.    TFTs are normal.       Review of Systems:    Alert awake oriented, comfortable, denies any complaints  No fevers chills nausea vomiting  No weakness, dizziness, seizures  no cough, shortness of breath, or wheezing  Denies any palpitations, chest pain, diaphoresis  Denies leg edema, pain or paresthesias  Denies any skin rashes  Denies abdominal pain, bloody stools, masses  Denies any depression or suicidal ideations      Historical Information   Past Medical History:   Diagnosis Date    Allergic     seasonal    Anxiety 2001    Diabetes mellitus (HCC) 2012    GERD (gastroesophageal reflux disease) 2015    Hypertension 2015     Past Surgical History:   Procedure Laterality Date    HYSTERECTOMY  2006    still has 1 ovary-?side     Social History     Substance and  "Sexual Activity   Alcohol Use Not Currently     Social History     Substance and Sexual Activity   Drug Use Never     Social History     Tobacco Use   Smoking Status Never    Passive exposure: Never   Smokeless Tobacco Never     Family History: non-contributory    Meds/Allergies   all current active meds have been reviewed  Allergies   Allergen Reactions    Statins Irritability, Lightheadedness and Other (See Comments)       Objective   Vitals: Blood pressure 138/78, pulse 58, height 5' 1.5\" (1.562 m), weight 82.5 kg (181 lb 12.8 oz), SpO2 99%., Body mass index is 33.79 kg/m².,   Weight (last 2 days)       Date/Time Weight    04/10/24 1256 82.5 (181.8)                      Physical Exam:  GEN: Lorena Phillips appears well, alert and oriented x 3, pleasant and cooperative   HEENT: pupils equal, round, and reactive to light; extraocular muscles intact  NECK: supple, no carotid bruits   HEART: regular rhythm, normal S1 and S2, no murmurs, clicks, gallops or rubs   LUNGS: clear to auscultation bilaterally; no wheezes, rales, or rhonchi   ABDOMEN: normal bowel sounds, soft, no tenderness, no distention  EXTREMITIES: peripheral pulses normal; no clubbing, cyanosis, or edema  NEURO: no focal findings   SKIN: normal without suspicious lesions on exposed skin    Lab Results:   No visits with results within 1 Day(s) from this visit.   Latest known visit with results is:   Appointment on 02/09/2024   Component Date Value Ref Range Status    WBC 02/09/2024 5.67  4.31 - 10.16 Thousand/uL Final    RBC 02/09/2024 4.52  3.81 - 5.12 Million/uL Final    Hemoglobin 02/09/2024 13.3  11.5 - 15.4 g/dL Final    Hematocrit 02/09/2024 39.5  34.8 - 46.1 % Final    MCV 02/09/2024 87  82 - 98 fL Final    MCH 02/09/2024 29.4  26.8 - 34.3 pg Final    MCHC 02/09/2024 33.7  31.4 - 37.4 g/dL Final    RDW 02/09/2024 13.0  11.6 - 15.1 % Final    MPV 02/09/2024 10.3  8.9 - 12.7 fL Final    Platelets 02/09/2024 194  149 - 390 Thousands/uL Final    " nRBC 02/09/2024 0  /100 WBCs Final    Segmented % 02/09/2024 39 (L)  43 - 75 % Final    Immature Grans % 02/09/2024 0  0 - 2 % Final    Lymphocytes % 02/09/2024 50 (H)  14 - 44 % Final    Monocytes % 02/09/2024 6  4 - 12 % Final    Eosinophils Relative 02/09/2024 4  0 - 6 % Final    Basophils Relative 02/09/2024 1  0 - 1 % Final    Absolute Neutrophils 02/09/2024 2.20  1.85 - 7.62 Thousands/µL Final    Absolute Immature Grans 02/09/2024 0.02  0.00 - 0.20 Thousand/uL Final    Absolute Lymphocytes 02/09/2024 2.83  0.60 - 4.47 Thousands/µL Final    Absolute Monocytes 02/09/2024 0.35  0.17 - 1.22 Thousand/µL Final    Eosinophils Absolute 02/09/2024 0.23  0.00 - 0.61 Thousand/µL Final    Basophils Absolute 02/09/2024 0.04  0.00 - 0.10 Thousands/µL Final    Sodium 02/09/2024 140  135 - 147 mmol/L Final    Potassium 02/09/2024 4.2  3.5 - 5.3 mmol/L Final    Chloride 02/09/2024 103  96 - 108 mmol/L Final    CO2 02/09/2024 27  21 - 32 mmol/L Final    ANION GAP 02/09/2024 10  mmol/L Final    BUN 02/09/2024 18  5 - 25 mg/dL Final    Creatinine 02/09/2024 0.79  0.60 - 1.30 mg/dL Final    Standardized to IDMS reference method    Glucose, Fasting 02/09/2024 89  65 - 99 mg/dL Final    Calcium 02/09/2024 8.9  8.4 - 10.2 mg/dL Final    AST 02/09/2024 13  13 - 39 U/L Final    ALT 02/09/2024 9  7 - 52 U/L Final    Specimen collection should occur prior to Sulfasalazine administration due to the potential for falsely depressed results.     Alkaline Phosphatase 02/09/2024 31 (L)  34 - 104 U/L Final    Total Protein 02/09/2024 6.8  6.4 - 8.4 g/dL Final    Albumin 02/09/2024 4.2  3.5 - 5.0 g/dL Final    Total Bilirubin 02/09/2024 0.35  0.20 - 1.00 mg/dL Final    Use of this assay is not recommended for patients undergoing treatment with eltrombopag due to the potential for falsely elevated results.  N-acetyl-p-benzoquinone imine (metabolite of Acetaminophen) will generate erroneously low results in samples for patients that have taken an  overdose of Acetaminophen.    eGFR 02/09/2024 84  ml/min/1.73sq m Final    Hemoglobin A1C 02/09/2024 6.1 (H)  Normal 4.0-5.6%; PreDiabetic 5.7-6.4%; Diabetic >=6.5%; Glycemic control for adults with diabetes <7.0% % Final    EAG 02/09/2024 128  mg/dl Final    Color, UA 02/09/2024 Light Yellow   Final    Clarity, UA 02/09/2024 Clear   Final    Specific Gravity, UA 02/09/2024 1.020  1.003 - 1.030 Final    pH, UA 02/09/2024 7.0  4.5, 5.0, 5.5, 6.0, 6.5, 7.0, 7.5, 8.0 Final    Leukocytes, UA 02/09/2024 Negative  Negative Final    Nitrite, UA 02/09/2024 Negative  Negative Final    Protein, UA 02/09/2024 Negative  Negative mg/dl Final    Glucose, UA 02/09/2024 Negative  Negative mg/dl Final    Ketones, UA 02/09/2024 Negative  Negative mg/dl Final    Urobilinogen, UA 02/09/2024 <2.0  <2.0 mg/dl mg/dl Final    Bilirubin, UA 02/09/2024 Negative  Negative Final    Occult Blood, UA 02/09/2024 Negative  Negative Final    Cholesterol 02/09/2024 174  See Comment mg/dL Final    Cholesterol:         Pediatric <18 Years        Desirable          <170 mg/dL      Borderline High    170-199 mg/dL      High               >=200 mg/dL        Adult >=18 Years            Desirable         <200 mg/dL      Borderline High   200-239 mg/dL      High              >239 mg/dL      Triglycerides 02/09/2024 172 (H)  See Comment mg/dL Final    Triglyceride:     0-9Y            <75mg/dL     10Y-17Y         <90 mg/dL       >=18Y     Normal          <150 mg/dL     Borderline High 150-199 mg/dL     High            200-499 mg/dL        Very High       >499 mg/dL    Specimen collection should occur prior to Metamizole administration due to the potential for falsely depressed results.    HDL, Direct 02/09/2024 48 (L)  >=50 mg/dL Final    LDL Calculated 02/09/2024 92  0 - 100 mg/dL Final    LDL Cholesterol:     Optimal           <100 mg/dl     Near Optimal      100-129 mg/dl     Above Optimal       Borderline High 130-159 mg/dl       High            160-189  mg/dl       Very High       >189 mg/dl         This screening LDL is a calculated result.   It does not have the accuracy of the Direct Measured LDL in the monitoring of patients with hyperlipidemia and/or statin therapy.   Direct Measure LDL ([id number]) must be ordered separately in these patients.    Non-HDL-Chol (CHOL-HDL) 02/09/2024 126  mg/dl Final    SANDHU SYNDROME DNA ANALYSIS 02/09/2024 Not Detected   Final    Comment: Pathogenic variant not detected.  No pathogenic or likely pathogenic variants were found in the 11 genes included on this test. This result reduces (but does not eliminate) the likelihood of a diagnosis of hereditary breast and ovarian cancer (HBOC) syndrome, Sandhu syndrome, and familial hypercholesterolemia (FH) for this individual. Other clinical diagnostic testing for these three conditions could identify variants not detected by this test. If this individual has had previous testing, these results should be taken into consideration during risk assessments and medical management.  No pathogenic or likely pathogenic variants were detected in the genes associated with Sandhu syndrome. The genes tested for this condition were MLH1, MSH2, MSH6, PMS2, and EPCAM.  Evaluation of 11 genes associated with hereditary breast and ovarian cancer (HBOC) syndrome, Sandhu syndrome and familial hypercholesterolemia (FH). The genes tested are BRCA1, BRCA2, MLH1, MSH2, MSH6, PMS2, EPCAM, APOB, LDLR,                            LDLRAP1, and PCSK9.  Genes tested: BRCA1,BRCA2,MLH1,MSH2,MSH6,PMS2,EPCAM,APOB,LDLR,LDLRAP1,PCSK9  Interpretive report performed and validated by [facility] [employer]ep[employer]ITALide TM ([facility]) Laboratory; [address] [place] [address] (CLIA# [id number], CAP #[id number]). A portion of testing may have been performed at a remote location. A list of remote personnel is available upon request. Sequencing done at: [email or web address] [address] [facility] [facility], [address], [place] [address]. (CLIA# [id number], CAP  #[id number]). This test has not been cleared or approved by the U.S. Food and Drug Administration.  approvals: John Campbell, Ph.D. [facility]  email: [email or web address]    HEREDITARY BREAST & OVARIAN CANCER* 02/09/2024 Not Detected   Final    Comment: Pathogenic variant not detected.  No pathogenic or likely pathogenic variants were found in the 11 genes included on this test. This result reduces (but does not eliminate) the likelihood of a diagnosis of hereditary breast and ovarian cancer (HBOC) syndrome, Robins syndrome, and familial hypercholesterolemia (FH) for this individual. Other clinical diagnostic testing for these three conditions could identify variants not detected by this test. If this individual has had previous testing, these results should be taken into consideration during risk assessments and medical management.  No pathogenic or likely pathogenic variants were detected in the genes associated with hereditary breast and ovarian cancer (HBOC) syndrome. The genes tested for this condition were BRCA1 and BRCA2.  Evaluation of 11 genes associated with hereditary breast and ovarian cancer (HBOC) syndrome, Robins syndrome and familial hypercholesterolemia (FH). The genes tested are BRCA1, BRCA2, MLH1, MSH2, MSH6, PMS2,                            EPCAM, APOB, LDLR, LDLRAP1, and PCSK9.  Genes tested: BRCA1,BRCA2,MLH1,MSH2,MSH6,PMS2,EPCAM,APOB,LDLR,LDLRAP1,PCSK9  Interpretive report performed and validated by [facility] GeneGuide TM ([facility]) [facility]; [address] [place] [address] (CLIA# [id number], CAP #[id number]). A portion of testing may have been performed at a remote location. A list of remote personnel is available upon request. Sequencing done at: [employer] [address] [place] [address], [address], [place] [address]. (CLIA# [id number], CAP #[id number]). This test has not been cleared or approved by the U.S. Food and Drug Administration.  approvals: John Campbell, Ph.D. [facility]  email:  [email or web address]    FAMILIAL HYPERCHOLESTEROLEMIA DNA * 02/09/2024 Not Detected   Final    Comment: Pathogenic variant not detected.  No pathogenic or likely pathogenic variants were found in the 11 genes included on this test. This result reduces (but does not eliminate) the likelihood of a diagnosis of hereditary breast and ovarian cancer (HBOC) syndrome, Robins syndrome, and familial hypercholesterolemia (FH) for this individual. Other clinical diagnostic testing for these three conditions could identify variants not detected by this test. If this individual has had previous testing, these results should be taken into consideration during risk assessments and medical management.  No pathogenic or likely pathogenic variants were detected in the genes associated with familial hypercholesterolemia (FH). The genes tested for this condition were APOB, LDLR, LDLRAP1, and PCSK9.  Evaluation of 11 genes associated with hereditary breast and ovarian cancer (HBOC) syndrome, Robins syndrome and familial hypercholesterolemia (FH). The genes tested are BRCA1, BRCA2, MLH1, MSH2, MSH6, PMS2,                            EPCAM, APOB, LDLR, LDLRAP1, and PCSK9.  Genes tested: BRCA1,BRCA2,MLH1,MSH2,MSH6,PMS2,EPCAM,APOB,LDLR,LDLRAP1,PCSK9  Methods and Limitations: DNA extracted from this individual's sample was captured and enriched using a custom set of reagents ([employer] Exome+ chemistry). Targeted regions were then sequenced using an Illumina DNA sequencing system. The individual's sequence was then matched to a modified version of the industry standard reference genome (GRCh38). Variant calling was completed using a customized version of [employer]'s [employer]q software, requiring 20x coverage for validated variant calls. Copy number variants (CNVs) were called using a proprietary bioinfomatics pipeline that compared the coverage profile of the sample with the coverage profiles of other reference set samples. [facility]  then analyzed the generated variant data for the exons and 10 bp of flanking intronic sequence (and select tagged intronic variants) of the 11 genes included in [facility] from the                            [facility] Secure Database. The sample was reviewed for single nucleotide variants (SNVs), indels up to 20 bp in length, and CNVs that are known or predicted to be actionable. NOTE: This assay has limited sensitivity to CNVs smaller than a few exons. APOB, PCSK9, and LDLR interpretation and reporting is specific to the familial hypercholesterolemia phenotype. Variants associated with other phenotypes such as hypobetalipoproteinemia are not included. Some known complex variants like the inversion of exons 1-7 in the MSH2 gene (Jonathon inversion), exons 11-15 of the PMS2 gene, or variants within or immediately adjacent to long homopolymer runs are not analyzed or reported. Additionally, there are regions that are not covered, such as deep intronic, promoter, and enhancer regions. It is important to note that this assay cannot detect all variants known to increase disease risk. Other clinical diagnostic testing for these three conditions could identify variants not detected by this [employer] test.                            If this individual has had previous testing, these results should be taken into consideration during risk assessments and medical management.  Interpretive report performed and validated by [facility] GeneGuide TM ([facility]; [address] [place] [address] (CLIA# [id number], CAP #[id number]). A portion of testing may have been performed at a remote location. A list of remote personnel is available upon request. Sequencing done at: [employer] [address] [place] [address], [address], [place] [address]. (CLIA# [id number], CAP #[id number]). This test has not been cleared or approved by the U.S. Food and Drug Administration.  approvals: John Campbell, Ph.D. [facility]  email: [email or web address]                          Imaging: I have personally reviewed pertinent reports.

## 2024-04-19 ENCOUNTER — HOSPITAL ENCOUNTER (OUTPATIENT)
Dept: NON INVASIVE DIAGNOSTICS | Facility: CLINIC | Age: 54
Discharge: HOME | End: 2024-04-19
Payer: COMMERCIAL

## 2024-04-19 VITALS
HEIGHT: 61 IN | HEART RATE: 73 BPM | OXYGEN SATURATION: 99 % | DIASTOLIC BLOOD PRESSURE: 74 MMHG | WEIGHT: 181 LBS | SYSTOLIC BLOOD PRESSURE: 130 MMHG | BODY MASS INDEX: 34.17 KG/M2

## 2024-04-19 DIAGNOSIS — R00.1 BRADYCARDIA: ICD-10-CM

## 2024-04-19 LAB
MAX HR PERCENT: 79 %
MAX HR: 130 BPM
RATE PRESSURE PRODUCT: NORMAL
SL CV STRESS RECOVERY BP: NORMAL MMHG
SL CV STRESS RECOVERY HR: 65 BPM
SL CV STRESS RECOVERY O2 SAT: 98 %
SL CV STRESS STAGE REACHED: 3
STRESS ANGINA INDEX: 0
STRESS BASELINE BP: NORMAL MMHG
STRESS BASELINE HR: 73 BPM
STRESS O2 SAT REST: 99 %
STRESS PEAK HR: 127 BPM
STRESS POST ESTIMATED WORKLOAD: 7.5 METS
STRESS POST EXERCISE DUR MIN: 6 MIN
STRESS POST EXERCISE DUR SEC: 11 SEC
STRESS POST O2 SAT PEAK: 99 %
STRESS POST PEAK BP: 142 MMHG

## 2024-04-19 PROCEDURE — 93017 CV STRESS TEST TRACING ONLY: CPT

## 2024-04-19 PROCEDURE — 93018 CV STRESS TEST I&R ONLY: CPT | Performed by: INTERNAL MEDICINE

## 2024-04-19 PROCEDURE — 93016 CV STRESS TEST SUPVJ ONLY: CPT | Performed by: INTERNAL MEDICINE

## 2024-04-21 LAB
CHEST PAIN STATEMENT: NORMAL
MAX DIASTOLIC BP: 82 MMHG
MAX PREDICTED HEART RATE: 164 BPM
PROTOCOL NAME: NORMAL
REASON FOR TERMINATION: NORMAL
STRESS POST EXERCISE DUR MIN: 6 MIN
STRESS POST EXERCISE DUR SEC: 11 SEC
STRESS POST PEAK HR: 130 BPM
STRESS POST PEAK SYSTOLIC BP: 142 MMHG
TARGET HR FORMULA: NORMAL
TEST INDICATION: NORMAL

## 2024-05-10 DIAGNOSIS — E78.5 HYPERLIPIDEMIA, UNSPECIFIED HYPERLIPIDEMIA TYPE: ICD-10-CM

## 2024-05-10 DIAGNOSIS — K58.9 IRRITABLE BOWEL SYNDROME WITHOUT DIARRHEA: ICD-10-CM

## 2024-05-10 RX ORDER — DICYCLOMINE HYDROCHLORIDE 10 MG/1
CAPSULE ORAL
Qty: 180 CAPSULE | Refills: 1 | Status: SHIPPED | OUTPATIENT
Start: 2024-05-10

## 2024-05-10 RX ORDER — EZETIMIBE 10 MG/1
10 TABLET ORAL DAILY
Qty: 90 TABLET | Refills: 1 | Status: SHIPPED | OUTPATIENT
Start: 2024-05-10

## 2024-06-25 ENCOUNTER — APPOINTMENT (OUTPATIENT)
Dept: LAB | Age: 54
End: 2024-06-25
Payer: COMMERCIAL

## 2024-06-25 DIAGNOSIS — E11.40 TYPE 2 DIABETES MELLITUS WITH DIABETIC NEUROPATHY, WITHOUT LONG-TERM CURRENT USE OF INSULIN (HCC): ICD-10-CM

## 2024-06-25 LAB
ALBUMIN SERPL BCG-MCNC: 4.1 G/DL (ref 3.5–5)
ALP SERPL-CCNC: 28 U/L (ref 34–104)
ALT SERPL W P-5'-P-CCNC: 10 U/L (ref 7–52)
ANION GAP SERPL CALCULATED.3IONS-SCNC: 10 MMOL/L (ref 4–13)
AST SERPL W P-5'-P-CCNC: 17 U/L (ref 13–39)
BILIRUB SERPL-MCNC: 0.26 MG/DL (ref 0.2–1)
BUN SERPL-MCNC: 19 MG/DL (ref 5–25)
CALCIUM SERPL-MCNC: 9.1 MG/DL (ref 8.4–10.2)
CHLORIDE SERPL-SCNC: 102 MMOL/L (ref 96–108)
CO2 SERPL-SCNC: 29 MMOL/L (ref 21–32)
CREAT SERPL-MCNC: 0.79 MG/DL (ref 0.6–1.3)
CREAT UR-MCNC: 71.8 MG/DL
EST. AVERAGE GLUCOSE BLD GHB EST-MCNC: 134 MG/DL
GFR SERPL CREATININE-BSD FRML MDRD: 83 ML/MIN/1.73SQ M
GLUCOSE P FAST SERPL-MCNC: 100 MG/DL (ref 65–99)
HBA1C MFR BLD: 6.3 %
MICROALBUMIN UR-MCNC: <7 MG/L
POTASSIUM SERPL-SCNC: 4.2 MMOL/L (ref 3.5–5.3)
PROT SERPL-MCNC: 6.6 G/DL (ref 6.4–8.4)
SODIUM SERPL-SCNC: 141 MMOL/L (ref 135–147)

## 2024-06-25 PROCEDURE — 82570 ASSAY OF URINE CREATININE: CPT

## 2024-06-25 PROCEDURE — 82043 UR ALBUMIN QUANTITATIVE: CPT

## 2024-06-25 PROCEDURE — 80053 COMPREHEN METABOLIC PANEL: CPT

## 2024-06-25 PROCEDURE — 36415 COLL VENOUS BLD VENIPUNCTURE: CPT

## 2024-06-25 PROCEDURE — 83036 HEMOGLOBIN GLYCOSYLATED A1C: CPT

## 2024-06-28 ENCOUNTER — OFFICE VISIT (OUTPATIENT)
Dept: INTERNAL MEDICINE CLINIC | Facility: CLINIC | Age: 54
End: 2024-06-28
Payer: COMMERCIAL

## 2024-06-28 VITALS
DIASTOLIC BLOOD PRESSURE: 76 MMHG | WEIGHT: 182.6 LBS | SYSTOLIC BLOOD PRESSURE: 122 MMHG | HEIGHT: 61 IN | HEART RATE: 59 BPM | OXYGEN SATURATION: 98 % | BODY MASS INDEX: 34.48 KG/M2

## 2024-06-28 DIAGNOSIS — M25.561 ACUTE PAIN OF RIGHT KNEE: ICD-10-CM

## 2024-06-28 DIAGNOSIS — M25.511 ACUTE PAIN OF RIGHT SHOULDER: ICD-10-CM

## 2024-06-28 DIAGNOSIS — E11.40 TYPE 2 DIABETES MELLITUS WITH DIABETIC NEUROPATHY, WITHOUT LONG-TERM CURRENT USE OF INSULIN (HCC): Primary | ICD-10-CM

## 2024-06-28 DIAGNOSIS — E78.5 HYPERLIPIDEMIA, UNSPECIFIED HYPERLIPIDEMIA TYPE: ICD-10-CM

## 2024-06-28 PROCEDURE — 99214 OFFICE O/P EST MOD 30 MIN: CPT | Performed by: INTERNAL MEDICINE

## 2024-06-28 NOTE — ASSESSMENT & PLAN NOTE
For the next visit I have requested a lipid profile patient is currently on Zetia Tricor and Vascepa for management of cholesterol and triglycerides

## 2024-06-28 NOTE — PROGRESS NOTES
Ambulatory Visit  Name: Lorena Phillips      : 1970      MRN: [id number]  Encounter Provider: Yo Tanner MD  Encounter Date: 2024   Encounter department: [facility] INTERNAL MEDICINE    Assessment & Plan   1. Acute pain of right knee  Assessment & Plan:  In the right knee without trauma recommend x-ray for evaluation of possible arthritis may benefit from physical therapy.  Review results with patient upon completion of study  Orders:  -     XR knee 3 vw right non injury; Future; Expected date: 2024  2. Acute pain of right shoulder  Assessment & Plan:  Right shoulder pain which is more chronic than acute for approximately 1-1/2 years and is having difficulty moving her arm backwards and also above her head.  Did have a fall in the past prior to the onset of the symptoms.  Does not seem to have any significant decrease in range of motion on exam does have tenderness over the bicep tendon insertion site.  Referral onto orthopedics for evaluation of shoulder has been requested.  X-ray some early degenerative arthritis changes but no other significant findings.  Orders:  -     Ambulatory Referral to Orthopedic Surgery; Future  3. Type 2 diabetes mellitus with diabetic neuropathy, without long-term current use of insulin (formerly [facility])  Assessment & Plan:  Current control of type 2 diabetes is good with hemoglobin A1c running at 6.3% patient is on a combination of diet and metformin 500 mg daily at breakfast time.  Refer to endocrinology to see if she qualifies for possible medication that would address both diabetes as well as weight loss  Lab Results   Component Value Date    HGBA1C 6.3 (H) 2024     Orders:  -     Ambulatory Referral to Endocrinology; Future  -     Comprehensive metabolic panel; Future; Expected date: 10/01/2024  -     Hemoglobin A1C; Future; Expected date: 10/01/2024  4. Hyperlipidemia, unspecified hyperlipidemia type  Assessment & Plan:  For the next visit I have  requested a lipid profile patient is currently on Zetia Tricor and Vascepa for management of cholesterol and triglycerides  Orders:  -     Lipid panel; Future; Expected date: 10/01/2024         History of Present Illness     This 54-year-old female patient returns to our office today for a routine follow-up visit.  During today's visit we reviewed the patient's most recent blood work pertaining to diabetic control and also kidney performance.    Patient has several concerns on today's visit.  She indicates that she is experiencing difficulty losing weight.  She was originally on the noon plan and was successful at losing weight however the weight loss has now plateaued.  We discussed a possible endocrinology consultation to see if she qualifies for locations that would be both beneficial for weight loss as well as controlling her diabetes.    Patient also has been experiencing right knee discomfort.  She indicates that it clicks when she walks and has a difficult time negotiating stairs especially going down.  She denies any history of trauma.  Has no apparent swelling of the knee joint or any increased warmth emanating from the joint.    Patient also has been experiencing right shoulder pain this started after a fall.  Pain has been present for approximately 1-1/2 years she has a difficult time moving the shoulder joint backwards and also difficult time lifting her shoulder up.      Review of Systems   Musculoskeletal:  Positive for arthralgias.        Right knee and shoulder pain as described in HPI   All other systems reviewed and are negative.    Past Medical History:   Diagnosis Date    Allergic     seasonal    Anxiety 2001    Diabetes mellitus (HCC) 2012    GERD (gastroesophageal reflux disease) 2015    Hypertension 2015     Past Surgical History:   Procedure Laterality Date    HYSTERECTOMY  2006    still has 1 ovary-?side     Family History   Problem Relation Age of Onset    Anxiety disorder Mother      Lymphoma Father     Hypertension Father             Diabetes Father             No Known Problems Sister     Stomach cancer Maternal Grandmother 48    Lung cancer Maternal Grandfather 80    No Known Problems Paternal Grandmother     No Known Problems Paternal Grandfather     No Known Problems Maternal Aunt     No Known Problems Maternal Aunt     No Known Problems Paternal Aunt     No Known Problems Paternal Aunt     Leukemia Paternal Uncle 39    Lung cancer Paternal Uncle 75    Coronary artery disease Paternal Aunt     Thyroid disease Paternal Aunt      Social History     Tobacco Use    Smoking status: Never     Passive exposure: Never    Smokeless tobacco: Never   Vaping Use    Vaping status: Never Used   Substance and Sexual Activity    Alcohol use: Not Currently    Drug use: Never    Sexual activity: Yes     Partners: Female     Birth control/protection: Post-menopausal     Current Outpatient Medications on File Prior to Visit   Medication Sig    bromocriptine (PARLODEL) 2.5 mg tablet Take 1 tablet (2.5 mg total) by mouth daily    dicyclomine (BENTYL) 10 mg capsule TAKE 1 CAPSULE BY MOUTH TWO TIMES A DAY.    estradiol (ESTRACE) 1 mg tablet Take 1 mg by mouth daily    ezetimibe (ZETIA) 10 mg tablet TAKE 1 TABLET BY MOUTH DAILY.    gabapentin (NEURONTIN) 100 mg capsule TAKE 2 CAPSULES BY MOUTH 2 TIMES A DAY    gabapentin (NEURONTIN) 300 mg capsule Take 300 mg by mouth in the morning At bed time    Icosapent Ethyl (Vascepa) 1 g CAPS Take 2 capsules (2 g total) by mouth 2 (two) times a day (Patient taking differently: Take 2 g by mouth in the morning)    metFORMIN (GLUCOPHAGE-XR) 500 mg 24 hr tablet Take 1 tablet (500 mg total) by mouth daily with breakfast    omeprazole (PriLOSEC) 20 mg delayed release capsule Take 1 capsule (20 mg total) by mouth daily    fenofibrate (TRICOR) 145 mg tablet Take 1 tablet (145 mg total) by mouth daily    valACYclovir (VALTREX) 1,000 mg tablet Take 1 tablet (1,000 mg  "total) by mouth 2 (two) times a day for 5 days (Patient taking differently: Take 1,000 mg by mouth if needed)     Allergies   Allergen Reactions    Statins Irritability, Lightheadedness and Other (See Comments)     Immunization History   Administered Date(s) Administered    COVID-19 MODERNA VACC 0.5 ML IM 01/13/2021, 02/10/2021, 06/16/2022    INFLUENZA 10/29/2013, 10/15/2014, 10/01/2015, 10/10/2016, 10/13/2017, 11/02/2018, 10/01/2019, 10/23/2020, 11/04/2021, 10/25/2022, 10/13/2023    Influenza, injectable, quadrivalent, preservative free 0.5 mL 10/13/2023    Pneumococcal Polysaccharide PPV23 02/26/2020    Tdap 04/04/2019    Tuberculin Skin Test-PPD Intradermal 11/03/2010, 10/13/2017, 04/18/2022    Zoster Vaccine Recombinant 04/19/2019, 07/05/2019     Objective     /76   Pulse 59   Ht 5' 1\" (1.549 m)   Wt 82.8 kg (182 lb 9.6 oz)   SpO2 98%   BMI 34.50 kg/m²     Physical Exam  Vitals and nursing note reviewed.   Constitutional:       General: She is not in acute distress.     Appearance: She is well-developed.   HENT:      Head: Normocephalic and atraumatic.   Eyes:      Conjunctiva/sclera: Conjunctivae normal.   Cardiovascular:      Rate and Rhythm: Normal rate and regular rhythm.      Heart sounds: Normal heart sounds. No murmur heard.  Pulmonary:      Effort: Pulmonary effort is normal. No respiratory distress.      Breath sounds: Normal breath sounds. No wheezing, rhonchi or rales.   Abdominal:      Palpations: Abdomen is soft.   Musculoskeletal:      Cervical back: Neck supple.      Comments: Examination of the right shoulder confirms the presence of tenderness over the bicep tendon.  Patient in range of motion.    Evaluation of the right knee indicates no evidence of effusion nor any evidence of increased temperature emanating from the joint.  Perhaps a 1 to 2 degree laxity of medial and lateral movement of the joint    Skin:     General: Skin is warm and dry.      Capillary Refill: Capillary refill " takes less than 2 seconds.   Neurological:      Mental Status: She is alert.   Psychiatric:         Mood and Affect: Mood normal.       Administrative Statements

## 2024-06-28 NOTE — ASSESSMENT & PLAN NOTE
Current control of type 2 diabetes is good with hemoglobin A1c running at 6.3% patient is on a combination of diet and metformin 500 mg daily at breakfast time.  Refer to endocrinology to see if she qualifies for possible medication that would address both diabetes as well as weight loss  Lab Results   Component Value Date    HGBA1C 6.3 (H) 06/25/2024

## 2024-06-28 NOTE — ASSESSMENT & PLAN NOTE
Right shoulder pain which is more chronic than acute for approximately 1-1/2 years and is having difficulty moving her arm backwards and also above her head.  Did have a fall in the past prior to the onset of the symptoms.  Does not seem to have any significant decrease in range of motion on exam does have tenderness over the bicep tendon insertion site.  Referral onto orthopedics for evaluation of shoulder has been requested.  X-ray some early degenerative arthritis changes but no other significant findings.

## 2024-06-28 NOTE — ASSESSMENT & PLAN NOTE
In the right knee without trauma recommend x-ray for evaluation of possible arthritis may benefit from physical therapy.  Review results with patient upon completion of study

## 2024-06-29 ENCOUNTER — APPOINTMENT (OUTPATIENT)
Dept: RADIOLOGY | Age: 54
End: 2024-06-29
Payer: COMMERCIAL

## 2024-06-29 DIAGNOSIS — M25.561 ACUTE PAIN OF RIGHT KNEE: ICD-10-CM

## 2024-06-29 PROCEDURE — 73562 X-RAY EXAM OF KNEE 3: CPT

## 2024-06-30 DIAGNOSIS — K29.70 GASTRITIS WITHOUT BLEEDING, UNSPECIFIED CHRONICITY, UNSPECIFIED GASTRITIS TYPE: ICD-10-CM

## 2024-06-30 DIAGNOSIS — G62.9 NEUROPATHY: ICD-10-CM

## 2024-06-30 DIAGNOSIS — G25.81 RESTLESS LEG SYNDROME: ICD-10-CM

## 2024-06-30 DIAGNOSIS — E78.5 HYPERLIPIDEMIA, UNSPECIFIED HYPERLIPIDEMIA TYPE: ICD-10-CM

## 2024-06-30 RX ORDER — FENOFIBRATE 145 MG/1
145 TABLET, COATED ORAL DAILY
Qty: 90 TABLET | Refills: 1 | Status: SHIPPED | OUTPATIENT
Start: 2024-06-30 | End: 2025-06-30

## 2024-06-30 RX ORDER — BROMOCRIPTINE MESYLATE 2.5 MG/1
2.5 TABLET ORAL DAILY
Qty: 90 TABLET | Refills: 1 | Status: SHIPPED | OUTPATIENT
Start: 2024-06-30

## 2024-06-30 RX ORDER — OMEPRAZOLE 20 MG/1
20 CAPSULE, DELAYED RELEASE ORAL DAILY
Qty: 90 CAPSULE | Refills: 1 | Status: SHIPPED | OUTPATIENT
Start: 2024-06-30

## 2024-06-30 RX ORDER — GABAPENTIN 100 MG/1
200 CAPSULE ORAL 2 TIMES DAILY
Qty: 360 CAPSULE | Refills: 1 | Status: SHIPPED | OUTPATIENT
Start: 2024-06-30

## 2024-07-11 ENCOUNTER — OFFICE VISIT (OUTPATIENT)
Dept: OBGYN CLINIC | Facility: OTHER | Age: 54
End: 2024-07-11
Payer: COMMERCIAL

## 2024-07-11 VITALS
WEIGHT: 182 LBS | DIASTOLIC BLOOD PRESSURE: 67 MMHG | SYSTOLIC BLOOD PRESSURE: 109 MMHG | BODY MASS INDEX: 34.36 KG/M2 | HEIGHT: 61 IN | HEART RATE: 60 BPM

## 2024-07-11 DIAGNOSIS — M24.811 INTERNAL DERANGEMENT OF SHOULDER, RIGHT: Primary | ICD-10-CM

## 2024-07-11 PROCEDURE — 99204 OFFICE O/P NEW MOD 45 MIN: CPT | Performed by: ORTHOPAEDIC SURGERY

## 2024-07-11 NOTE — PROGRESS NOTES
"  Assessment  Diagnoses and all orders for this visit:    Internal derangement of shoulder, right      Discussion and Plan:    Patient has an examination worrisome for rotator cuff pathology, given their lack of improvement with physical therapy and continued HEP they are indicated at this time for an MRI scan to evaluate for surgical pathology.  We will review the results of the study when it has been obtained and discuss further treatment options.  Patient instructed to continue her HEP at this time.     Subjective:   Patient ID: Lorena Phillips is a 54 y.o. female      HPI  The patient presents with a chief complaint of right shoulder pain.   The pain began 1 year(s) ago.  Patient was initially seen by Dr. Eastman last year and completed a course of PT.  Symptoms worsened 1/2024 after a fall at home. The patient describes the pain as aching, dull, and sharp in intensity,  intermittent in timing, and localizes the pain to the  right anterior shoulder.  The pain is worse with overhead work and overuse and relieved by rest.  The pain is not associated with numbness and tingling.  The pain is not associated with constitutional symptoms. The patient is awoken at night by the pain.      Patient has continued her physical therapy directed home exercise program as well as water aerobics since her fall in January.            The following portions of the patient's history were reviewed and updated as appropriate: allergies, current medications, past family history, past medical history, past social history, past surgical history and problem list.        Objective:  /67 (BP Location: Left arm, Patient Position: Sitting, Cuff Size: Adult)   Pulse 60   Ht 5' 1\" (1.549 m)   Wt 82.6 kg (182 lb)   BMI 34.39 kg/m²       Right Shoulder Exam     Tenderness   The patient is experiencing no tenderness.    Range of Motion   External rotation:  70   Forward flexion:  160   Internal rotation 0 degrees:  Lumbar     Muscle " Strength   Abduction: 5/5   Internal rotation: 5/5   External rotation: 4/5     Tests   Sabillon test: positive  Impingement: positive    Other   Erythema: absent  Sensation: normal  Pulse: present            Physical Exam  Vitals reviewed.   Constitutional:       Appearance: She is well-developed.   Eyes:      Pupils: Pupils are equal, round, and reactive to light.   Pulmonary:      Effort: Pulmonary effort is normal.      Breath sounds: Normal breath sounds.   Abdominal:      General: Abdomen is flat. There is no distension.   Skin:     General: Skin is warm and dry.   Neurological:      Mental Status: She is alert and oriented to person, place, and time.   Psychiatric:         Behavior: Behavior normal.         Thought Content: Thought content normal.         Judgment: Judgment normal.           I have personally reviewed pertinent films in PACS and my interpretation is as follows.    Right shoulder x-rays demonstrates no fracture or dislocation.    External Records Reviewed: office notes from Dr. Eastman at [facility]       Scribe Attestation      I,:  Shelly Love am acting as a scribe while in the presence of the attending physician.:       I,:  Vance Knox MD personally performed the services described in this documentation    as scribed in my presence.:

## 2024-07-19 ENCOUNTER — HOSPITAL ENCOUNTER (OUTPATIENT)
Dept: RADIOLOGY | Age: 54
Discharge: HOME | End: 2024-07-19
Payer: COMMERCIAL

## 2024-07-19 VITALS — HEIGHT: 61 IN | BODY MASS INDEX: 34.36 KG/M2 | WEIGHT: 182 LBS

## 2024-07-19 DIAGNOSIS — Z12.31 VISIT FOR SCREENING MAMMOGRAM: ICD-10-CM

## 2024-07-19 PROCEDURE — 77063 BREAST TOMOSYNTHESIS BI: CPT

## 2024-07-19 PROCEDURE — 77067 SCR MAMMO BI INCL CAD: CPT

## 2024-07-22 ENCOUNTER — HOSPITAL ENCOUNTER (OUTPATIENT)
Dept: RADIOLOGY | Age: 54
Discharge: HOME | End: 2024-07-22
Payer: COMMERCIAL

## 2024-07-22 DIAGNOSIS — M24.811 INTERNAL DERANGEMENT OF SHOULDER, RIGHT: ICD-10-CM

## 2024-07-22 PROCEDURE — 73221 MRI JOINT UPR EXTREM W/O DYE: CPT

## 2024-07-24 DIAGNOSIS — K29.70 GASTRITIS WITHOUT BLEEDING, UNSPECIFIED CHRONICITY, UNSPECIFIED GASTRITIS TYPE: ICD-10-CM

## 2024-07-25 ENCOUNTER — OFFICE VISIT (OUTPATIENT)
Dept: OBGYN CLINIC | Facility: OTHER | Age: 54
End: 2024-07-25
Payer: COMMERCIAL

## 2024-07-25 VITALS
HEART RATE: 57 BPM | WEIGHT: 182 LBS | DIASTOLIC BLOOD PRESSURE: 76 MMHG | SYSTOLIC BLOOD PRESSURE: 120 MMHG | BODY MASS INDEX: 34.36 KG/M2 | HEIGHT: 61 IN

## 2024-07-25 DIAGNOSIS — M75.101 TEAR OF RIGHT SUPRASPINATUS TENDON: Primary | ICD-10-CM

## 2024-07-25 PROCEDURE — 99214 OFFICE O/P EST MOD 30 MIN: CPT | Performed by: ORTHOPAEDIC SURGERY

## 2024-07-25 RX ORDER — OMEPRAZOLE 20 MG/1
20 CAPSULE, DELAYED RELEASE ORAL DAILY
Qty: 90 CAPSULE | Refills: 0 | OUTPATIENT
Start: 2024-07-25

## 2024-07-25 NOTE — PATIENT INSTRUCTIONS
You are being scheduled for a shoulder arthroscopy to treat your symptoms.  Below are some instructions and information on what to expect before and after your surgery.        Pre-Surgical Preparation for Arthroscopic Shoulder Surgery:     You will be contacted the evening prior to your surgery to confirm the scheduled time of the procedure and when to arrive at the hospital.   Do not eat or drink anything after midnight the night before your surgery.  Since you are having out-patient surgery, make sure that you have someone who can drive you home later in the day.  Also, prepare that person for a long day, as the process of safely preparing for and recovering from the procedure is more time consuming than the actual procedure!      As you will be in a sling after surgery, please wear or bring a loose fitting button-down shirt so that you can easily place this over the sling when you leave the surgical suite.  This avoids having to place the operative arm in a sleeve.  Most patients find that this is the easiest outfit to wear for the first week or so after surgery so you may want to plan accordingly.    Most patients find that lying down in bed after shoulder surgery accentuates their discomfort.  This is likely related to the effect of gravity on the swelling in the shoulder.  As a result, most patients sleep better in a recliner or in bed with pillows propped up behind their back for the first few days or weeks after surgery.  It is a good idea to plan for this ahead of time so there will be less hassle getting things set up the night after surgery.       What to Expect After Arthroscopic Shoulder Surgery:    It is normal to have swelling and discomfort in the shoulder for several days or a week after surgery.  It is also normal to have a small amount of drainage from the surgical wounds (especially the first few days after surgery), as we put fluid into the shoulder to visualize the structures during surgery.   "It is NOT normal to have foul smelling, purulent drainage and if this is noted, please contact the office immediately or proceed to the emergency room for evaluation as this may indicate an infection.     Applying ice bags to the shoulder may help with pain that is not controlled by the regional block.  Ice should be applied 20-30 minutes at a time, every hour or two.  Make sure to put a thin towel or T-shirt next to your skin to avoid direct contact of the ice with the skin. Icing is most helpful in the first 48 hours, although many people find that continuing past this time frame lessens their postoperative pain.  Please note that your post-operative dressing is not conductive to ice, so if you need to, it is okay to remove that dressing even the night after surgery and place band-aids over the wounds in order for the ice to take effect.     Pain Control    Most patients will receive a nerve block, the local anesthetic may keep your whole arm numb for up to 4 days.  You will be given a prescription for narcotic pain medication when you are discharged from the hospital.  With the newer nerve block that is being utilized, patients are rarely requiring the use of this narcotic pain medication.  If you find you do not tolerate that type of pain medicine well, call our office and we will try another one.     In addition to the narcotic pain medication, it is safe to use an anti-inflammatory (unless the patient has a medical condition that would not allow safe use of this mediation).  This includes the Advil, Motrin, Ibuprofen and Alleve category of medications.  Simply follow the over the counter dosing on the package and use as indicated as another adjunct.  Importantly since these medications are all very similar, use only one of them.  Tylenol is a separate medication that can be utilized as well and can be taken at the same time as the other medication or given in a \"staggered\" manner.  Just make sure that you " follow the dosing on the over the counter bottle instructions.  Also make sure that the pain medication prescribed by Dr Knox's team does not contain acetaminophen (this is found in Percocet and Vicodin).   Typically we do not prescribe those types of pain medications but if for some reason that has been prescribed DO NOT add more Tylenol (acetaminophen) as you could end up taking too much of that medication.    As mentioned above, most patients find that lying down accentuates their discomfort. You might sleep better in a recliner, or propped up in bed.     Dr. Knox encourages patients to safely ambulate around the house as much as possible in the first few days after the procedure as this can help with blood circulation in the legs. While the incidence of blood clots is very rare following shoulder surgery, early ambulation is a great way to help decrease the already low rate.    24 hours after the surgery you may remove the bandage and cover incisions with Band-Aids if needed. At that time you may shower, the wounds will have a surgical glue that will protect them from shower water but do not submerge your incisions directly (bathing or swimming) until at least 2 weeks post-operatively.  It is safe to let the arm hang at the side and take a shower and put on a shirt without the sling on.  Just make sure that you do not use the operative are to reach out and grab anything as that may damage the repair.  When you are done showering and getting dressed please return the operative arm to the sling.    Unless noted otherwise in your discharge paperwork, Dr. Knox uses absorbable sutures so they do not need to be removed.    Dr. Knox’s physician assistant (PA) will see you in the office a few days after the procedure to review the intra-operative findings and to initiate physical therapy if appropriate.  A post-operative appointment should have been scheduled for you already, but if for some reason this did  not happen, please call the office to make one.     Physical therapy is important after nearly all shoulder surgeries and a detailed rehabilitation plan based on the specific intra-operative findings and procedures will be provided to your therapist at the first post-operative office visit.  Most patients have post-operative therapy appointments scheduled pre-operatively, but if you do not, that will be handled at the first post-operative office visit.  Unless expressly directed otherwise it is safe to remove the sling even the first day after the surgery and let the arm hang by the side.  This allows patients to shower and even put a shirt on (bad arm in the sleeve first).  It is also safe to flex and extend their wrist, hand and fingers as much as possible when the block wears off.  These simple motions can serve to pump fluid out of the forearm and decrease swelling in the arm.

## 2024-07-25 NOTE — PROGRESS NOTES
Assessment  Diagnoses and all orders for this visit:    Tear of right supraspinatus tendon      Discussion and Plan:    MRI reviewed with patient. Discussed continued conservative vs surgical options with patient.   Discussed arthroscopic rotator cuff repair with patient which we recommend fixing so tear doesn't extend, retract, develop muscle atrophy which would impact quality and success of future repair.   Discussed cortisone injection is an option but would preclude us from performing surgery for at least 3 months and may impact the quality and success of the repair, after discussing this the injection was decided against.  Patient does wish to proceed forward scheduling for arthroscopic rotator cuff pair of the right shoulder at her convenience.    A thorough discussion was performed with the patient regarding the risks and benefit of operative and nonoperative treatment of their rotator cuff tear.  Risks discussed include but were not limited to infection, neurovascular injury, recurrent tear, nonhealing of the repair, need for further surgery, need for biceps tenodesis or tenotomy, stiffness, need for prolonged rehabilitation, as well as the risk of anesthesia.  After this discussion all questions were answered and informed consent was obtained in the office for arthroscopic rotator cuff repair of the right shoulder.  The patient will be scheduled for this procedure accordingly.   Patient will follow up post operatively     Subjective:   Patient ID: Lorena Phillips is a 54 y.o. female      HPI  The patient presents with a chief complaint of right shoulder pain.  She is here to review MRI.  The pain initially began 1 year(s) ago.  Patient was initially seen by Dr. Eastman last year and completed a course of PT.  Symptoms worsened 1/2024 after a fall at home. The patient describes the pain as aching, dull, and sharp in intensity,  intermittent in timing, and localizes the pain to the  right anterior  "shoulder.  The pain is worse with overhead work and overuse, lifting items away from her body and relieved by rest.  The pain is not associated with numbness and tingling.  The pain is not associated with constitutional symptoms. The patient is awoken at night by the pain.  Using tylenol as needed for pain control.     Patient has continued her physical therapy directed home exercise program as well as water aerobics since her fall in January.      The following portions of the patient's history were reviewed and updated as appropriate: allergies, current medications, past family history, past medical history, past social history, past surgical history and problem list.        Objective:  /76 (BP Location: Left arm, Patient Position: Sitting, Cuff Size: Adult)   Pulse 57   Ht 5' 1\" (1.549 m)   Wt 82.6 kg (182 lb)   BMI 34.39 kg/m²       Right Shoulder Exam     Tenderness   The patient is experiencing no tenderness.    Range of Motion   External rotation:  70   Forward flexion:  170   Internal rotation 0 degrees:  T12     Muscle Strength   Internal rotation: 5/5   External rotation: 4/5   Supraspinatus: 4/5     Tests   Sabillon test: positive  Impingement: positive  Drop arm: negative    Other   Erythema: absent  Sensation: normal  Pulse: present            Physical Exam  Vitals and nursing note reviewed.   Constitutional:       Appearance: She is well-developed.   HENT:      Head: Normocephalic and atraumatic.   Eyes:      Pupils: Pupils are equal, round, and reactive to light.   Cardiovascular:      Rate and Rhythm: Normal rate and regular rhythm.      Pulses: Normal pulses.      Heart sounds: Normal heart sounds.   Pulmonary:      Effort: Pulmonary effort is normal. No respiratory distress.      Breath sounds: Normal breath sounds.   Abdominal:      General: Abdomen is flat. There is no distension.      Palpations: Abdomen is soft.   Musculoskeletal:      Cervical back: Normal range of motion and neck " supple.   Skin:     General: Skin is warm and dry.   Neurological:      Mental Status: She is alert and oriented to person, place, and time.   Psychiatric:         Mood and Affect: Mood normal.         Behavior: Behavior normal.         Thought Content: Thought content normal.         Judgment: Judgment normal.           I have personally reviewed pertinent films in PACS and my interpretation is as follows.    MRI of right shoulder full thickness critical zone tear of supraspinatus tendon with no retraction or muscle atrophy.        Scribe Attestation      I,:  Stanley Rodrigues am acting as a scribe while in the presence of the attending physician.:       I,:  Vance Knox MD personally performed the services described in this documentation    as scribed in my presence.:

## 2024-07-25 NOTE — LETTER
July 25, 2024     Yo Tanner MD  [address] [place] [address]  2nd Floor, [address]  [facility] [id number]    Patient: Lorena Phillips   YOB: 1970   Date of Visit: 7/25/2024       Dear Dr. Tanner:    Thank you for referring Lorena Phillips to me for evaluation. Below are my notes for this consultation.    If you have questions, please do not hesitate to call me. I look forward to following your patient along with you.         Sincerely,        Vance Knox MD        CC: No Recipients

## 2024-07-29 DIAGNOSIS — E78.1 HYPERTRIGLYCERIDEMIA: ICD-10-CM

## 2024-07-29 RX ORDER — ICOSAPENT ETHYL 1 G/1
2 CAPSULE ORAL 2 TIMES DAILY
Qty: 360 CAPSULE | Refills: 3 | Status: SHIPPED | OUTPATIENT
Start: 2024-07-29

## 2024-08-12 ENCOUNTER — TELEPHONE (OUTPATIENT)
Age: 54
End: 2024-08-12

## 2024-08-13 ENCOUNTER — CLINICAL SUPPORT (OUTPATIENT)
Dept: INTERNAL MEDICINE CLINIC | Facility: CLINIC | Age: 54
End: 2024-08-13
Payer: COMMERCIAL

## 2024-08-13 DIAGNOSIS — Z11.1 SCREENING FOR TUBERCULOSIS: Primary | ICD-10-CM

## 2024-08-13 PROCEDURE — 86580 TB INTRADERMAL TEST: CPT | Performed by: INTERNAL MEDICINE

## 2024-08-15 ENCOUNTER — CLINICAL SUPPORT (OUTPATIENT)
Dept: INTERNAL MEDICINE CLINIC | Facility: CLINIC | Age: 54
End: 2024-08-15

## 2024-08-15 DIAGNOSIS — Z11.1 ENCOUNTER FOR PPD SKIN TEST READING: Primary | ICD-10-CM

## 2024-08-15 LAB
INDURATION: 0 MM
TB SKIN TEST: NEGATIVE

## 2024-08-27 ENCOUNTER — OFFICE VISIT (OUTPATIENT)
Dept: OBGYN CLINIC | Facility: CLINIC | Age: 54
End: 2024-08-27
Payer: COMMERCIAL

## 2024-08-27 VITALS
SYSTOLIC BLOOD PRESSURE: 130 MMHG | HEIGHT: 62 IN | BODY MASS INDEX: 34.04 KG/M2 | WEIGHT: 185 LBS | DIASTOLIC BLOOD PRESSURE: 70 MMHG

## 2024-08-27 DIAGNOSIS — N95.1 HOT FLASHES DUE TO MENOPAUSE: ICD-10-CM

## 2024-08-27 DIAGNOSIS — Z01.419 ENCOUNTER FOR GYNECOLOGICAL EXAMINATION WITHOUT ABNORMAL FINDING: Primary | ICD-10-CM

## 2024-08-27 PROCEDURE — S0610 ANNUAL GYNECOLOGICAL EXAMINA: HCPCS | Performed by: OBSTETRICS & GYNECOLOGY

## 2024-08-27 RX ORDER — ESTRADIOL 1 MG/1
1 TABLET ORAL DAILY
Qty: 90 TABLET | Refills: 4 | Status: SHIPPED | OUTPATIENT
Start: 2024-08-27

## 2024-08-27 NOTE — PROGRESS NOTES
Lorena Phillips   1970    CC:  Yearly exam    S:  54 y.o. female here for yearly exam. She presents as a new patient having previously seen Dr Pink at [facility].      She had a history of a supracervical hysterectomy for AUB not resolved with endometrial ablation.  She still has one ovary.  She went on HRT for hot flashes and mood changes ?6-7 years ago.  She still has some hot flashes at night even on the HRT.  Discussed absence of great long-term data but given good control of DM overall and overall good lipid panel, I see no reason to stop it.  She would prefer to continue.      She is postmenopausal and has had no vaginal bleeding.  She denies vaginal discharge, itching, odor or dryness.     Sexual activity: She is sexually active with a female partner without pain, bleeding or dryness.     Last Pap: 2023 per patient report but no records available; previous pap 4/13/2017 normal/negative HPV; history of supracervical hysterectomy  Last Mammo: 7/19/2024 - BIRAD-1  Last Colonoscopy: 12/3/2018 - polyps; Cologuard 11/10/2023 - normal   Last DEXA: never    We reviewed ASCCP guidelines for Pap testing.       Current Outpatient Medications:     bromocriptine (PARLODEL) 2.5 mg tablet, Take 1 tablet (2.5 mg total) by mouth daily, Disp: 90 tablet, Rfl: 1    dicyclomine (BENTYL) 10 mg capsule, TAKE 1 CAPSULE BY MOUTH TWO TIMES A DAY., Disp: 180 capsule, Rfl: 1    estradiol (ESTRACE) 1 mg tablet, Take 1 mg by mouth daily, Disp: , Rfl:     ezetimibe (ZETIA) 10 mg tablet, TAKE 1 TABLET BY MOUTH DAILY., Disp: 90 tablet, Rfl: 1    fenofibrate (TRICOR) 145 mg tablet, Take 1 tablet (145 mg total) by mouth daily, Disp: 90 tablet, Rfl: 1    gabapentin (NEURONTIN) 100 mg capsule, Take 2 capsules (200 mg total) by mouth 2 (two) times a day, Disp: 360 capsule, Rfl: 1    gabapentin (NEURONTIN) 300 mg capsule, Take 300 mg by mouth in the morning At bed time, Disp: , Rfl:     Icosapent Ethyl (Vascepa) 1 g CAPS, Take 2 capsules (2 g  total) by mouth 2 (two) times a day, Disp: 360 capsule, Rfl: 3    metFORMIN (GLUCOPHAGE-XR) 500 mg 24 hr tablet, Take 1 tablet (500 mg total) by mouth daily with breakfast, Disp: 90 tablet, Rfl: 3    omeprazole (PriLOSEC) 20 mg delayed release capsule, Take 1 capsule (20 mg total) by mouth daily, Disp: 90 capsule, Rfl: 1    valACYclovir (VALTREX) 1,000 mg tablet, Take 1 tablet (1,000 mg total) by mouth 2 (two) times a day for 5 days (Patient not taking: Reported on 2024), Disp: 10 tablet, Rfl: 0  Social History     Socioeconomic History    Marital status: /Civil Union     Spouse name: Not on file    Number of children: Not on file    Years of education: Not on file    Highest education level: Not on file   Occupational History    Not on file   Tobacco Use    Smoking status: Never     Passive exposure: Never    Smokeless tobacco: Never   Vaping Use    Vaping status: Never Used   Substance and Sexual Activity    Alcohol use: Not Currently    Drug use: Never    Sexual activity: Yes     Partners: Female     Birth control/protection: Post-menopausal   Other Topics Concern    Not on file   Social History Narrative    Not on file     Social Determinants of Health     Financial Resource Strain: Not on file   Food Insecurity: Not on file   Transportation Needs: Not on file   Physical Activity: Not on file   Stress: Not on file   Social Connections: Not on file   Intimate Partner Violence: Not on file   Housing Stability: Not on file     Family History   Problem Relation Age of Onset    Anxiety disorder Mother     Stroke Mother     Lymphoma Father     Hypertension Father             Diabetes Father             Cancer Father     No Known Problems Sister     Stomach cancer Maternal Grandmother 48    Cancer Maternal Grandmother     Lung cancer Maternal Grandfather 80    Cancer Maternal Grandfather     No Known Problems Paternal Grandmother     Heart disease Paternal Grandfather     No Known Problems  "Maternal Aunt     No Known Problems Maternal Aunt     No Known Problems Paternal Aunt     No Known Problems Paternal Aunt     Leukemia Paternal Uncle 39    Lung cancer Paternal Uncle 75    Coronary artery disease Paternal Aunt     Thyroid disease Paternal Aunt     Diabetes Brother         Type 2     Past Medical History:   Diagnosis Date    Allergic     seasonal    Anxiety 2001    Diabetes mellitus (HCC) 2012    GERD (gastroesophageal reflux disease) 2015    Hypertension 2015        Review of Systems   Respiratory: Negative.    Cardiovascular: Negative.    Gastrointestinal: Negative for constipation and diarrhea.   Genitourinary: Negative for difficulty urinating, pelvic pain, vaginal bleeding, vaginal discharge, itching or odor.    O:  Blood pressure 130/70, height 5' 2\" (1.575 m), weight 83.9 kg (185 lb).    Patient appears well and is not in distress  Neck is supple without masses  Breasts are symmetrical without mass, tenderness, nipple discharge, skin changes or adenopathy.   Abdomen is soft and nontender without masses.   External genitals are normal without lesions or rashes.  Urethral meatus and urethra are normal  Bladder is normal to palpation  Vagina is normal without discharge or bleeding.   Cervix is normal without discharge or lesion.   Uterus is surgically absent.  Adnexa are normal, nontender, without palpable mass.     A:   Yearly exam.     P:   Pap - will have patient sign record release today to get report   Mammo scheduled   Colon cancer screening due 2026   DEXA due age 63 unless new risk factors develop   Rx estradiol sent    RTO one year for yearly exam or sooner as needed.     "

## 2024-08-29 ENCOUNTER — ANESTHESIA EVENT (OUTPATIENT)
Dept: ANESTHESIOLOGY | Facility: HOSPITAL | Age: 54
End: 2024-08-29

## 2024-08-29 ENCOUNTER — ANESTHESIA (OUTPATIENT)
Dept: ANESTHESIOLOGY | Facility: HOSPITAL | Age: 54
End: 2024-08-29

## 2024-08-30 ENCOUNTER — OFFICE VISIT (OUTPATIENT)
Dept: ENDOCRINOLOGY | Facility: CLINIC | Age: 54
End: 2024-08-30
Payer: COMMERCIAL

## 2024-08-30 VITALS
DIASTOLIC BLOOD PRESSURE: 78 MMHG | HEART RATE: 60 BPM | HEIGHT: 61 IN | SYSTOLIC BLOOD PRESSURE: 130 MMHG | BODY MASS INDEX: 35.16 KG/M2 | WEIGHT: 186.2 LBS | OXYGEN SATURATION: 98 %

## 2024-08-30 DIAGNOSIS — E11.40 TYPE 2 DIABETES MELLITUS WITH DIABETIC NEUROPATHY, WITHOUT LONG-TERM CURRENT USE OF INSULIN (HCC): Primary | ICD-10-CM

## 2024-08-30 DIAGNOSIS — E66.01 CLASS 2 SEVERE OBESITY DUE TO EXCESS CALORIES WITH SERIOUS COMORBIDITY AND BODY MASS INDEX (BMI) OF 35.0 TO 35.9 IN ADULT (HCC): ICD-10-CM

## 2024-08-30 PROBLEM — E66.812 CLASS 2 SEVERE OBESITY DUE TO EXCESS CALORIES WITH SERIOUS COMORBIDITY AND BODY MASS INDEX (BMI) OF 35.0 TO 35.9 IN ADULT (HCC): Status: ACTIVE | Noted: 2024-08-30

## 2024-08-30 PROCEDURE — 99244 OFF/OP CNSLTJ NEW/EST MOD 40: CPT

## 2024-08-30 RX ORDER — TIRZEPATIDE 2.5 MG/.5ML
INJECTION, SOLUTION SUBCUTANEOUS
Qty: 2 ML | Refills: 0 | Status: SHIPPED | OUTPATIENT
Start: 2024-08-30

## 2024-08-30 NOTE — PROGRESS NOTES
Assessment and Plan:  1. Type 2 diabetes mellitus with diabetic neuropathy, without long-term current use of insulin ([facility])  Assessment & Plan:    Lab Results   Component Value Date    HGBA1C 6.3 (H) 06/25/2024     Well-controlled type 2 diabetes mellitus  Home regimen includes: Metformin 500 mg daily  Glucose checks at home, once a day prior to breakfast, without hyperglycemia most days, range , occasional as high as 114  Has made diet changes and has an established physical activity regimen, has used the [email or web address] jacob  Labs from June 25, 2024 - , LDL 92, , A1c 6.3%, albumin/creatinine ratio unable to calculate  Diabetic foot exam performed today, as noted in physical exam    Referral to diabetic education  Continue with metformin 500 mg daily  Plan to start Tirzepatide 2.5 mg weekly for 4 weeks after her upcoming shoulder surgery.  We discussed on touching base after her third dose to assess for any side effects, if not experiencing side effects we can plan to increase dose to 5 mg weekly for 4 weeks.  Discussed side effects associated with Tirzepatide including GI side effects and pancreatitis.  Also made aware of MTC noted during animal studies but not with humans.  Labs to be done prior to next visit include hemoglobin A1c and CMP (has orders from her PCP)  Follow-up in 3 months  Orders:  -     Ambulatory Referral to Endocrinology  -     tirzepatide (Zepbound) 2.5 mg/0.5 mL auto-injector; Take 2.5 mg weekly for 4 weeks  -     Ambulatory Referral to Diabetic Education; Future  2. Class 2 severe obesity due to excess calories with serious comorbidity and body mass index (BMI) of 35.0 to 35.9 in adult ([facility])  Assessment & Plan:  BMI 35.18  Has not been doing lifestyle modifications in diet and exercise.  Also used the [employer] jacob.  Lost 40 pounds with these interventions, noted some gain of 6 to 7 pounds and her reports that her weight has plateaued.  Comorbidity of type 2 diabetes mellitus,  hyperlipidemia and hypertension    Continue with lifestyle modifications with diet and exercise changes  Start Tirzepatide after shoulder surgery  Follow-up in 3 months.  Orders:  -     tirzepatide (Zepbound) 2.5 mg/0.5 mL auto-injector; Take 2.5 mg weekly for 4 weeks     Nutrition Assessment and Intervention:     Reviewed food recall journal      Physical Activity Assessment and Intervention:    Activity journal reviewed      Tobacco and Toxic Substance Assessment and Intervention:     Tobacco use screening performed    Alcohol and drug use screening performed         HPI:   Lorena Phillips   - 54 y.o. female with history of type 2 diabetes mellitus, morbid obesity and hyperlipidemia, that arrives to clinic for management of diabetes and obesity. Patient reports overall feeling well.  Reports that her diabetes is well-controlled but her main goal is to lose weight.  In the last year, she joined the [employer] jacob which assisted with her weight loss journey and helped her change her diet and increase exercise activity.  With this, she was able to lose 40 pounds, then gained 6 to 7 pounds and her weight has plateaued.    Type II DM diagnosed 2012.  Managed with metformin 500 mg daily, previously was on a higher dose but was decreased due to improved A1c.  Prior medications include Januvia-discontinued due to improved A1c.  Also reports that she had been on various statins in the past, but was intolerant.    Denies prior hospitalizations related to diabetes mellitus  Positive family history of diabetes mellitus type type II-dad who made use of insulin    Glucose checks-device: Fingersticks, once a day  Morning sugars range from 95 to 100.  Occasionally higher, as high as 114  Hypoglycemic events: Denies  Hyperglycemic events: Denies    Patient's diet -has made many dietary changes.  Has reduced portion size.  Breakfast-liquid scrambled eggs, with chunky salsa, whole-wheat English muffin, or whole-wheat tortilla wrap, with  hummus, and fruit.  Lunch-turkey sandwich with whole-wheat bread, spicy mustard, fruit, with either honey wheat pretzels or popcorn.  With also a small candy such as a chocolate.  Dinner-varies.  Usually a lean meat (chicken, turkey or pork), a baked potato or sweet potato, with vegetables.  Frequently snacks in the evening/night-grapes, crackers, cheese, pretzels.  Had not participated in diabetic education in the past  Patient's exercise regimen/physical activity -walks 6 days out of 7 days of the week, 2.5-3 miles.  [employer] aerobics for 45-60 minutes..     Patient does not make use of any alcohol, tobacco products or recreational drugs.    Patient is up-to-date on eye exam on November/2023 showing negative for diabetic retinopathy, dental care up-to-date and follows with podiatry for history of neuropathy.      ACEi/ARB: None-previously on lisinopril but was having hypotension  Statin: None-intolerant      Patient has no other complaints at this time.      Subjective:  Review of Systems   Constitutional:  Negative for appetite change, diaphoresis, fatigue and unexpected weight change.   Respiratory:  Negative for chest tightness and shortness of breath.    Cardiovascular:  Negative for palpitations and leg swelling.   Gastrointestinal:  Negative for abdominal pain, constipation, diarrhea, nausea and vomiting.   Endocrine: Negative for polydipsia, polyphagia and polyuria.   Skin:  Negative for color change and wound.   Neurological:  Negative for tremors, weakness, light-headedness and headaches.        Feet neuropathy        Patient Active Problem List   Diagnosis   • Hyperlipidemia   • Type 2 diabetes mellitus with diabetic neuropathy, without long-term current use of insulin ([facility])   • Restless leg syndrome   • Postmenopausal syndrome   • Primary central sleep apnea   • Hypertriglyceridemia   • Gastritis   • Bradycardia   • Abnormal electrocardiogram   • Acute pain of right knee   • Acute pain of right shoulder   •  Tear of right supraspinatus tendon   • Class 2 severe obesity due to excess calories with serious comorbidity and body mass index (BMI) of 35.0 to 35.9 in adult (HCC)        Current Outpatient Medications   Medication Sig Dispense Refill   • bromocriptine (PARLODEL) 2.5 mg tablet Take 1 tablet (2.5 mg total) by mouth daily 90 tablet 1   • dicyclomine (BENTYL) 10 mg capsule TAKE 1 CAPSULE BY MOUTH TWO TIMES A DAY. 180 capsule 1   • estradiol (ESTRACE) 1 mg tablet Take 1 tablet (1 mg total) by mouth daily 90 tablet 4   • ezetimibe (ZETIA) 10 mg tablet TAKE 1 TABLET BY MOUTH DAILY. 90 tablet 1   • fenofibrate (TRICOR) 145 mg tablet Take 1 tablet (145 mg total) by mouth daily 90 tablet 1   • gabapentin (NEURONTIN) 100 mg capsule Take 2 capsules (200 mg total) by mouth 2 (two) times a day 360 capsule 1   • gabapentin (NEURONTIN) 300 mg capsule Take 300 mg by mouth in the morning At bed time     • Icosapent Ethyl (Vascepa) 1 g CAPS Take 2 capsules (2 g total) by mouth 2 (two) times a day 360 capsule 3   • metFORMIN (GLUCOPHAGE-XR) 500 mg 24 hr tablet Take 1 tablet (500 mg total) by mouth daily with breakfast 90 tablet 3   • omeprazole (PriLOSEC) 20 mg delayed release capsule Take 1 capsule (20 mg total) by mouth daily 90 capsule 1   • tirzepatide (Zepbound) 2.5 mg/0.5 mL auto-injector Take 2.5 mg weekly for 4 weeks 2 mL 0   • valACYclovir (VALTREX) 1,000 mg tablet Take 1 tablet (1,000 mg total) by mouth 2 (two) times a day for 5 days (Patient not taking: Reported on 8/27/2024) 10 tablet 0     No current facility-administered medications for this visit.        Allergies   Allergen Reactions   • Statins Irritability, Lightheadedness and Other (See Comments)        The following portions of the patient's history were reviewed and updated as appropriate: allergies, current medications, past family history, past medical history, past social history, past surgical history and problem list.             Objective:  /78    "Pulse 60   Ht 5' 1\" (1.549 m)   Wt 84.5 kg (186 lb 3.2 oz)   SpO2 98%   BMI 35.18 kg/m²      Body mass index is 35.18 kg/m².     Physical Exam  Vitals reviewed.   Constitutional:       Appearance: Normal appearance. She is morbidly obese. She is not ill-appearing or diaphoretic.   HENT:      Head: Normocephalic and atraumatic.   Eyes:      General: No scleral icterus.     Conjunctiva/sclera: Conjunctivae normal.   Cardiovascular:      Rate and Rhythm: Normal rate and regular rhythm.      Pulses: Normal pulses. no weak pulses.           Dorsalis pedis pulses are 2+ on the right side and 2+ on the left side.        Posterior tibial pulses are 2+ on the right side and 2+ on the left side.      Heart sounds: Normal heart sounds. No murmur heard.     No gallop.   Pulmonary:      Effort: Pulmonary effort is normal. No respiratory distress.      Breath sounds: Normal breath sounds. No wheezing or rales.   Abdominal:      General: Bowel sounds are normal. There is no distension.      Palpations: Abdomen is soft.      Tenderness: There is no abdominal tenderness.   Musculoskeletal:         General: Normal range of motion.      Cervical back: Normal range of motion and neck supple.      Right lower leg: No edema.      Left lower leg: No edema.   Feet:      Right foot:      Skin integrity: No ulcer, skin breakdown, erythema, warmth, callus or dry skin.      Left foot:      Skin integrity: No ulcer, skin breakdown, erythema, warmth, callus or dry skin.   Skin:     General: Skin is warm and dry.      Coloration: Skin is not jaundiced or pale.   Neurological:      General: No focal deficit present.      Mental Status: She is alert and oriented to person, place, and time. Mental status is at baseline.      Sensory: No sensory deficit.   Psychiatric:         Mood and Affect: Mood normal.         Behavior: Behavior normal.      Diabetic Foot Exam    Patient's shoes and socks removed.    Right Foot/Ankle   Right Foot " Inspection  Skin Exam: skin normal and skin intact. No dry skin, no warmth, no callus, no erythema, no maceration, no abnormal color, no pre-ulcer, no ulcer and no callus.     Sensory   Monofilament testing: intact    Vascular  The right DP pulse is 2+. The right PT pulse is 2+.     Left Foot/Ankle  Left Foot Inspection  Skin Exam: skin normal and skin intact. No dry skin, no warmth, no erythema, no maceration, normal color, no pre-ulcer, no ulcer and no callus.     Sensory   Monofilament testing: intact    Vascular  The left DP pulse is 2+. The left PT pulse is 2+.     Assign Risk Category  No deformity present  No loss of protective sensation  No weak pulses  Risk: 0      Labs:  I have personally reviewed the following labs.   Latest Reference Range & Units 10/11/23 07:31 02/09/24 08:17 06/25/24 07:16   Sodium 135 - 147 mmol/L 139 140 141   Potassium 3.5 - 5.3 mmol/L 4.1 4.2 4.2   Chloride 96 - 108 mmol/L 105 103 102   Carbon Dioxide 21 - 32 mmol/L 30 27 29   ANION GAP 4 - 13 mmol/L 4 10 10   BUN 5 - 25 mg/dL 16 18 19   Creatinine 0.60 - 1.30 mg/dL 0.82 0.79 0.79   GLUCOSE, FASTING 65 - 99 mg/dL 90 89 100 (H)   Calcium 8.4 - 10.2 mg/dL 9.1 8.9 9.1   AST 13 - 39 U/L 14 13 17   ALT 7 - 52 U/L 10 9 10   ALK PHOS 34 - 104 U/L 31 (L) 31 (L) 28 (L)   Total Protein 6.4 - 8.4 g/dL 6.8 6.8 6.6   Albumin 3.5 - 5.0 g/dL 4.2 4.2 4.1   Total Bilirubin 0.20 - 1.00 mg/dL 0.30 0.35 0.26   GFR, Calculated ml/min/1.73sq m 80 84 83   Cholesterol See Comment mg/dL 166 174    Triglycerides See Comment mg/dL 178 (H) 172 (H)    HDL >=50 mg/dL 38 (L) 48 (L)    Non-HDL Cholesterol mg/dl 128 126    LDL Calculated 0 - 100 mg/dL 92 92    Hemoglobin A1C Normal 4.0-5.6%; PreDiabetic 5.7-6.4%; Diabetic >=6.5%; Glycemic control for adults with diabetes <7.0% % 6.0 (H) 6.1 (H) 6.3 (H)   eAG, EST AVG Glucose mg/dl 126 128 134   Albumin Creat Ratio    See Comment   (H): Data is abnormally high  (L): Data is abnormally low     Imaging:  I have  personally reviewed the following imaging.   Diabetes Eye Exam  Order: [id number]   Status: Final result       Visible to patient: Yes (seen)       Next appt: Today at 08:10 AM in Endocrinology (Rick Welch DO)    0 Result Notes       1  Topic      Component 11/10/23 11:18 AM   Right Eye Diabetic Retinopathy None   Left Eye Diabetic Retinopathy None               Last Resulted: 11/10/23 11:18 AM                Desean Morales MD  Endocrinology Fellow, PGY-4

## 2024-08-30 NOTE — ASSESSMENT & PLAN NOTE
Lab Results   Component Value Date    HGBA1C 6.3 (H) 06/25/2024     Well-controlled type 2 diabetes mellitus  Home regimen includes: Metformin 500 mg daily  Glucose checks at home, once a day prior to breakfast, without hyperglycemia most days, range , occasional as high as 114  Has made diet changes and has an established physical activity regimen, has used the [employer]  Labs from June 25, 2024 - , LDL 92, , A1c 6.3%, albumin/creatinine ratio unable to calculate  Diabetic foot exam performed today, as noted in physical exam    Referral to diabetic education  Continue with metformin 500 mg daily  Plan to start Tirzepatide 2.5 mg weekly for 4 weeks after her upcoming shoulder surgery.  We discussed on touching base after her third dose to assess for any side effects, if not experiencing side effects we can plan to increase dose to 5 mg weekly for 4 weeks.  Discussed side effects associated with Tirzepatide including GI side effects and pancreatitis.  Also made aware of MTC noted during animal studies but not with humans.  Labs to be done prior to next visit include hemoglobin A1c and CMP (has orders from her PCP)  Follow-up in 3 months

## 2024-08-30 NOTE — ASSESSMENT & PLAN NOTE
BMI 35.18  Has not been doing lifestyle modifications in diet and exercise.  Also used the [employer] jacob.  Lost 40 pounds with these interventions, noted some gain of 6 to 7 pounds and her reports that her weight has plateaued.  Comorbidity of type 2 diabetes mellitus, hyperlipidemia and hypertension    Continue with lifestyle modifications with diet and exercise changes  Start Tirzepatide after shoulder surgery  Follow-up in 3 months.

## 2024-08-30 NOTE — PATIENT INSTRUCTIONS
After your shoulder surgery in September you can start Tirzepatide - Take 2.5 mg weekly for 4 weeks. After your 3rd dose (3rd week), let us know how you are doing. If not experiencing side effects then we can send a script for Tirzepatide 5 mg weekly. If you are experience side effects let us know for recommendations.   Get labs done prior to next visit, at least 1 week before  Follow-up in 3 months    Called pt to inform of below. Pt stated having itching from [address] [address], wanted to make you aware.

## 2024-09-03 ENCOUNTER — ANESTHESIA EVENT (OUTPATIENT)
Dept: PERIOP | Facility: AMBULARY SURGERY CENTER | Age: 54
End: 2024-09-03
Payer: COMMERCIAL

## 2024-09-03 RX ORDER — CETIRIZINE HYDROCHLORIDE 10 MG/1
10 TABLET, CHEWABLE ORAL DAILY
COMMUNITY

## 2024-09-03 RX ORDER — MULTIVIT-MIN/IRON/FOLIC ACID/K 18-600-40
CAPSULE ORAL
COMMUNITY

## 2024-09-03 RX ORDER — OMEGA-3-ACID ETHYL ESTERS 1 G/1
2 CAPSULE, LIQUID FILLED ORAL DAILY
COMMUNITY

## 2024-09-03 RX ORDER — DIPHENOXYLATE HYDROCHLORIDE AND ATROPINE SULFATE 2.5; .025 MG/1; MG/1
1 TABLET ORAL DAILY
COMMUNITY

## 2024-09-03 NOTE — PRE-PROCEDURE INSTRUCTIONS
Pre-Surgery Instructions:   Medication Instructions    bromocriptine (PARLODEL) 2.5 mg tablet Take night before surgery    cetirizine (ZyrTEC) 10 MG chewable tablet Take night before surgery    dicyclomine (BENTYL) 10 mg capsule Take night before surgery    estradiol (ESTRACE) 1 mg tablet Take day of surgery.    ezetimibe (ZETIA) 10 mg tablet Take night before surgery    fenofibrate (TRICOR) 145 mg tablet Take night before surgery    gabapentin (NEURONTIN) 100 mg capsule Take night before surgery    Icosapent Ethyl (Vascepa) 1 g CAPS Take night before surgery    metFORMIN (GLUCOPHAGE-XR) 500 mg 24 hr tablet Hold day of surgery.    multivitamin (THERAGRAN) TABS Stop taking 7 days prior to surgery.    omega-3-acid ethyl esters (LOVAZA) 1 g capsule Stop taking 7 days prior to surgery.    omeprazole (PriLOSEC) 20 mg delayed release capsule Take night before surgery    valACYclovir (VALTREX) 1,000 mg tablet Uses PRN- OK to take day of surgery    Vitamin D, Cholecalciferol, 50 MCG (2000 UT) CAPS Stop taking 7 days prior to surgery.    Medication instructions for day surgery reviewed. Please use only a sip of water to take your instructed medications. Avoid all over the counter vitamins, supplements and NSAIDS for one week prior to surgery per anesthesia guidelines. Tylenol is ok to take as needed.     You will receive a call one business day prior to surgery with an arrival time and hospital directions. If your surgery is scheduled on a Monday, the hospital will be calling you on the Friday prior to your surgery. If you have not heard from anyone by 8pm, please call the hospital supervisor through the hospital  at [phone]. ([place] [phone] or [place] [phone]).    Do not eat or drink anything after midnight the night before your surgery, including candy, mints, lifesavers, or chewing gum. Do not drink alcohol 24hrs before your surgery. Try not to smoke at least 24hrs before your surgery.        Follow the pre surgery showering instructions as listed in the “My Surgical Experience Booklet” or otherwise provided by your surgeon's office. Do not use a blade to shave the surgical area 1 week before surgery. It is okay to use a clean electric clippers up to 24 hours before surgery. Do not apply any lotions, creams, including makeup, cologne, deodorant, or perfumes after showering on the day of your surgery. Do not use dry shampoo, hair spray, hair gel, or any type of hair products.     No contact lenses, eye make-up, or artificial eyelashes. Remove nail polish, including gel polish, and any artificial, gel, or acrylic nails if possible. Remove all jewelry including rings and body piercing jewelry.     Wear causal clothing that is easy to take on and off. Consider your type of surgery.    Keep any valuables, jewelry, piercings at home. Please bring any specially ordered equipment (sling, braces) if indicated.    Arrange for a responsible person to drive you to and from the hospital on the day of your surgery. Please confirm the visitor policy for the day of your procedure when you receive your phone call with an arrival time.     Call the surgeon's office with any new illnesses, exposures, or additional questions prior to surgery.    Please reference your “My Surgical Experience Booklet” for additional information to prepare for your upcoming surgery.

## 2024-09-04 ENCOUNTER — TELEPHONE (OUTPATIENT)
Dept: ENDOCRINOLOGY | Facility: CLINIC | Age: 54
End: 2024-09-04

## 2024-09-07 ENCOUNTER — PATIENT MESSAGE (OUTPATIENT)
Dept: ENDOCRINOLOGY | Facility: CLINIC | Age: 54
End: 2024-09-07

## 2024-09-09 ENCOUNTER — TELEPHONE (OUTPATIENT)
Dept: ENDOCRINOLOGY | Facility: CLINIC | Age: 54
End: 2024-09-09

## 2024-09-13 ENCOUNTER — ANESTHESIA (OUTPATIENT)
Dept: PERIOP | Facility: AMBULARY SURGERY CENTER | Age: 54
End: 2024-09-13
Payer: COMMERCIAL

## 2024-09-13 ENCOUNTER — HOSPITAL ENCOUNTER (OUTPATIENT)
Facility: AMBULARY SURGERY CENTER | Age: 54
Setting detail: OUTPATIENT SURGERY
Discharge: HOME | End: 2024-09-13
Attending: ORTHOPAEDIC SURGERY | Admitting: ORTHOPAEDIC SURGERY
Payer: COMMERCIAL

## 2024-09-13 VITALS
DIASTOLIC BLOOD PRESSURE: 58 MMHG | HEART RATE: 66 BPM | RESPIRATION RATE: 16 BRPM | BODY MASS INDEX: 33.49 KG/M2 | OXYGEN SATURATION: 94 % | SYSTOLIC BLOOD PRESSURE: 102 MMHG | HEIGHT: 62 IN | WEIGHT: 182 LBS | TEMPERATURE: 96.9 F

## 2024-09-13 DIAGNOSIS — M75.101 TEAR OF RIGHT SUPRASPINATUS TENDON: Primary | ICD-10-CM

## 2024-09-13 LAB
GLUCOSE SERPL-MCNC: 133 MG/DL (ref 65–140)
GLUCOSE SERPL-MCNC: 89 MG/DL (ref 65–140)

## 2024-09-13 PROCEDURE — 29827 SHO ARTHRS SRG RT8TR CUF RPR: CPT | Performed by: ORTHOPAEDIC SURGERY

## 2024-09-13 PROCEDURE — 82948 REAGENT STRIP/BLOOD GLUCOSE: CPT

## 2024-09-13 PROCEDURE — 29826 SHO ARTHRS SRG DECOMPRESSION: CPT | Performed by: ORTHOPAEDIC SURGERY

## 2024-09-13 PROCEDURE — 29828 SHO ARTHRS SRG BICP TENODSIS: CPT | Performed by: PHYSICIAN ASSISTANT

## 2024-09-13 PROCEDURE — NC001 PR NO CHARGE: Performed by: ORTHOPAEDIC SURGERY

## 2024-09-13 PROCEDURE — C1713 ANCHOR/SCREW BN/BN,TIS/BN: HCPCS | Performed by: ORTHOPAEDIC SURGERY

## 2024-09-13 PROCEDURE — 29828 SHO ARTHRS SRG BICP TENODSIS: CPT | Performed by: ORTHOPAEDIC SURGERY

## 2024-09-13 PROCEDURE — 29826 SHO ARTHRS SRG DECOMPRESSION: CPT | Performed by: PHYSICIAN ASSISTANT

## 2024-09-13 PROCEDURE — 29827 SHO ARTHRS SRG RT8TR CUF RPR: CPT | Performed by: PHYSICIAN ASSISTANT

## 2024-09-13 PROCEDURE — C9290 INJ, BUPIVACAINE LIPOSOME: HCPCS | Performed by: ANESTHESIOLOGY

## 2024-09-13 DEVICE — IMPLSYS 2NDRY FIXATN BIOSWVLK 4.75X19.1
Type: IMPLANTABLE DEVICE | Site: SHOULDER | Status: FUNCTIONAL
Brand: ARTHREX®

## 2024-09-13 DEVICE — SP FIBERTAK RC, DBLOAD TAPE BL/W, BLK/W
Type: IMPLANTABLE DEVICE | Site: SHOULDER | Status: FUNCTIONAL
Brand: ARTHREX®

## 2024-09-13 RX ORDER — ACETAMINOPHEN 325 MG/1
650 TABLET ORAL EVERY 6 HOURS PRN
Status: DISCONTINUED | OUTPATIENT
Start: 2024-09-13 | End: 2024-09-13 | Stop reason: HOSPADM

## 2024-09-13 RX ORDER — OXYCODONE HYDROCHLORIDE 5 MG/1
5 TABLET ORAL EVERY 4 HOURS PRN
Status: DISCONTINUED | OUTPATIENT
Start: 2024-09-13 | End: 2024-09-13 | Stop reason: HOSPADM

## 2024-09-13 RX ORDER — SODIUM CHLORIDE, SODIUM LACTATE, POTASSIUM CHLORIDE, CALCIUM CHLORIDE 600; 310; 30; 20 MG/100ML; MG/100ML; MG/100ML; MG/100ML
INJECTION, SOLUTION INTRAVENOUS CONTINUOUS PRN
Status: DISCONTINUED | OUTPATIENT
Start: 2024-09-13 | End: 2024-09-13

## 2024-09-13 RX ORDER — PROPOFOL 10 MG/ML
INJECTION, EMULSION INTRAVENOUS AS NEEDED
Status: DISCONTINUED | OUTPATIENT
Start: 2024-09-13 | End: 2024-09-13

## 2024-09-13 RX ORDER — OXYCODONE HYDROCHLORIDE 5 MG/1
5 TABLET ORAL EVERY 6 HOURS PRN
Qty: 13 TABLET | Refills: 0 | Status: SHIPPED | OUTPATIENT
Start: 2024-09-13

## 2024-09-13 RX ORDER — MIDAZOLAM HYDROCHLORIDE 2 MG/2ML
INJECTION, SOLUTION INTRAMUSCULAR; INTRAVENOUS AS NEEDED
Status: DISCONTINUED | OUTPATIENT
Start: 2024-09-13 | End: 2024-09-13

## 2024-09-13 RX ORDER — ONDANSETRON 2 MG/ML
4 INJECTION INTRAMUSCULAR; INTRAVENOUS ONCE AS NEEDED
Status: DISCONTINUED | OUTPATIENT
Start: 2024-09-13 | End: 2024-09-13 | Stop reason: HOSPADM

## 2024-09-13 RX ORDER — FENTANYL CITRATE/PF 50 MCG/ML
25 SYRINGE (ML) INJECTION
Status: DISCONTINUED | OUTPATIENT
Start: 2024-09-13 | End: 2024-09-13 | Stop reason: HOSPADM

## 2024-09-13 RX ORDER — FENTANYL CITRATE 50 UG/ML
INJECTION, SOLUTION INTRAMUSCULAR; INTRAVENOUS AS NEEDED
Status: DISCONTINUED | OUTPATIENT
Start: 2024-09-13 | End: 2024-09-13

## 2024-09-13 RX ORDER — EPHEDRINE SULFATE 50 MG/ML
INJECTION INTRAVENOUS AS NEEDED
Status: DISCONTINUED | OUTPATIENT
Start: 2024-09-13 | End: 2024-09-13

## 2024-09-13 RX ORDER — ONDANSETRON 2 MG/ML
INJECTION INTRAMUSCULAR; INTRAVENOUS AS NEEDED
Status: DISCONTINUED | OUTPATIENT
Start: 2024-09-13 | End: 2024-09-13

## 2024-09-13 RX ORDER — HYDROMORPHONE HCL/PF 1 MG/ML
0.5 SYRINGE (ML) INJECTION
Status: DISCONTINUED | OUTPATIENT
Start: 2024-09-13 | End: 2024-09-13 | Stop reason: HOSPADM

## 2024-09-13 RX ORDER — BUPIVACAINE HYDROCHLORIDE 5 MG/ML
INJECTION, SOLUTION EPIDURAL; INTRACAUDAL AS NEEDED
Status: DISCONTINUED | OUTPATIENT
Start: 2024-09-13 | End: 2024-09-13

## 2024-09-13 RX ORDER — LIDOCAINE HYDROCHLORIDE 10 MG/ML
INJECTION, SOLUTION EPIDURAL; INFILTRATION; INTRACAUDAL; PERINEURAL AS NEEDED
Status: DISCONTINUED | OUTPATIENT
Start: 2024-09-13 | End: 2024-09-13

## 2024-09-13 RX ORDER — CEFAZOLIN SODIUM 2 G/50ML
2000 SOLUTION INTRAVENOUS ONCE
Status: COMPLETED | OUTPATIENT
Start: 2024-09-13 | End: 2024-09-13

## 2024-09-13 RX ORDER — ONDANSETRON 2 MG/ML
4 INJECTION INTRAMUSCULAR; INTRAVENOUS EVERY 6 HOURS PRN
Status: DISCONTINUED | OUTPATIENT
Start: 2024-09-13 | End: 2024-09-13 | Stop reason: HOSPADM

## 2024-09-13 RX ORDER — DEXAMETHASONE SODIUM PHOSPHATE 10 MG/ML
INJECTION, SOLUTION INTRAMUSCULAR; INTRAVENOUS AS NEEDED
Status: DISCONTINUED | OUTPATIENT
Start: 2024-09-13 | End: 2024-09-13

## 2024-09-13 RX ADMIN — PROPOFOL 200 MG: 10 INJECTION, EMULSION INTRAVENOUS at 08:13

## 2024-09-13 RX ADMIN — MIDAZOLAM 1 MG: 1 INJECTION INTRAMUSCULAR; INTRAVENOUS at 07:42

## 2024-09-13 RX ADMIN — SODIUM CHLORIDE, SODIUM LACTATE, POTASSIUM CHLORIDE, AND CALCIUM CHLORIDE: .6; .31; .03; .02 INJECTION, SOLUTION INTRAVENOUS at 09:04

## 2024-09-13 RX ADMIN — BUPIVACAINE 20 ML: 13.3 INJECTION, SUSPENSION, LIPOSOMAL INFILTRATION at 07:48

## 2024-09-13 RX ADMIN — MIDAZOLAM 1 MG: 1 INJECTION INTRAMUSCULAR; INTRAVENOUS at 08:06

## 2024-09-13 RX ADMIN — DEXAMETHASONE SODIUM PHOSPHATE 10 MG: 10 INJECTION, SOLUTION INTRAMUSCULAR; INTRAVENOUS at 08:20

## 2024-09-13 RX ADMIN — LIDOCAINE HYDROCHLORIDE 50 MG: 10 INJECTION, SOLUTION EPIDURAL; INFILTRATION; INTRACAUDAL at 08:13

## 2024-09-13 RX ADMIN — EPHEDRINE SULFATE 5 MG: 50 INJECTION INTRAVENOUS at 08:40

## 2024-09-13 RX ADMIN — SODIUM CHLORIDE, SODIUM LACTATE, POTASSIUM CHLORIDE, AND CALCIUM CHLORIDE: .6; .31; .03; .02 INJECTION, SOLUTION INTRAVENOUS at 08:06

## 2024-09-13 RX ADMIN — FENTANYL CITRATE 50 MCG: 50 INJECTION INTRAMUSCULAR; INTRAVENOUS at 08:20

## 2024-09-13 RX ADMIN — CEFAZOLIN SODIUM 2000 MG: 2 SOLUTION INTRAVENOUS at 08:06

## 2024-09-13 RX ADMIN — FENTANYL CITRATE 50 MCG: 50 INJECTION INTRAMUSCULAR; INTRAVENOUS at 07:42

## 2024-09-13 RX ADMIN — EPHEDRINE SULFATE 5 MG: 50 INJECTION INTRAVENOUS at 08:25

## 2024-09-13 RX ADMIN — ONDANSETRON 4 MG: 2 INJECTION INTRAMUSCULAR; INTRAVENOUS at 08:20

## 2024-09-13 RX ADMIN — BUPIVACAINE HYDROCHLORIDE 10 ML: 5 INJECTION, SOLUTION EPIDURAL; INTRACAUDAL at 07:48

## 2024-09-13 NOTE — ANESTHESIA PREPROCEDURE EVALUATION
Procedure:  SHOULDER ARTHROSCOPIC ROTATOR CUFF REPAIR (Right: Shoulder)    Relevant Problems   CARDIO   (+) Hyperlipidemia   (+) Hypertriglyceridemia      ENDO   (+) Type 2 diabetes mellitus with diabetic neuropathy, without long-term current use of insulin (HCC)      PULMONARY   (+) Primary central sleep apnea      BMI 34    Physical Exam    Airway    Mallampati score: II  TM Distance: >3 FB  Neck ROM: full     Dental       Cardiovascular  Cardiovascular exam normal    Pulmonary  Pulmonary exam normal     Other Findings  post-pubertal.      Anesthesia Plan  ASA Score- 2     Anesthesia Type- general with ASA Monitors.         Additional Monitors:     Airway Plan:     Comment: + PNB.       Plan Factors-    Chart reviewed. EKG reviewed.  Existing labs reviewed. Patient summary reviewed.                  Induction- intravenous.    Postoperative Plan-         Informed Consent- Anesthetic plan and risks discussed with patient.  I personally reviewed this patient with the CRNA. Discussed and agreed on the Anesthesia Plan with the CRNA..

## 2024-09-13 NOTE — H&P
I identified and marked the patient in the pre-op holding area after confirming the surgical consent.  No changes to medical health since the H&P was preformed.     The patient's [facility] to ensure compliance with applicable state laws.           Assessment  Diagnoses and all orders for this visit:     Tear of right supraspinatus tendon        Discussion and Plan:     MRI reviewed with patient. Discussed continued conservative vs surgical options with patient.   Discussed arthroscopic rotator cuff repair with patient which we recommend fixing so tear doesn't extend, retract, develop muscle atrophy which would impact quality and success of future repair.   Discussed cortisone injection is an option but would preclude us from performing surgery for at least 3 months and may impact the quality and success of the repair, after discussing this the injection was decided against.  Patient does wish to proceed forward scheduling for arthroscopic rotator cuff pair of the right shoulder at her convenience.     A thorough discussion was performed with the patient regarding the risks and benefit of operative and nonoperative treatment of their rotator cuff tear.  Risks discussed include but were not limited to infection, neurovascular injury, recurrent tear, nonhealing of the repair, need for further surgery, need for biceps tenodesis or tenotomy, stiffness, need for prolonged rehabilitation, as well as the risk of anesthesia.  After this discussion all questions were answered and informed consent was obtained in the office for arthroscopic rotator cuff repair of the right shoulder.  The patient will be scheduled for this procedure accordingly.   Patient will follow up post operatively      Subjective:   Patient ID: Lorena Phillips is a 54 y.o. female        HPI  The patient presents with a chief complaint of right shoulder pain.  She is here to review MRI.  The  "pain initially began 1 year(s) ago.  Patient was initially seen by Dr. Eastman last year and completed a course of PT.  Symptoms worsened 1/2024 after a fall at home. The patient describes the pain as aching, dull, and sharp in intensity,  intermittent in timing, and localizes the pain to the  right anterior shoulder.  The pain is worse with overhead work and overuse, lifting items away from her body and relieved by rest.  The pain is not associated with numbness and tingling.  The pain is not associated with constitutional symptoms. The patient is awoken at night by the pain.  Using tylenol as needed for pain control.      Patient has continued her physical therapy directed home exercise program as well as water aerobics since her fall in January.       The following portions of the patient's history were reviewed and updated as appropriate: allergies, current medications, past family history, past medical history, past social history, past surgical history and problem list.           Objective:  /59   Pulse 57   Temp (!) 97.3 °F (36.3 °C) (Temporal)   Resp 18   Ht 5' 1.5\" (1.562 m)   Wt 82.6 kg (182 lb)   SpO2 100%   BMI 33.83 kg/m²        Right Shoulder Exam      Tenderness   The patient is experiencing no tenderness.     Range of Motion   External rotation:  70   Forward flexion:  170   Internal rotation 0 degrees:  T12      Muscle Strength   Internal rotation: 5/5   External rotation: 4/5   Supraspinatus: 4/5      Tests   Sabillon test: positive  Impingement: positive  Drop arm: negative     Other   Erythema: absent  Sensation: normal  Pulse: present                 Physical Exam  Vitals and nursing note reviewed.   Constitutional:       Appearance: She is well-developed.   HENT:      Head: Normocephalic and atraumatic.   Eyes:      Pupils: Pupils are equal, round, and reactive to light.   Cardiovascular:      Rate and Rhythm: Normal rate and regular rhythm.      Pulses: Normal pulses.      Heart " sounds: Normal heart sounds.   Pulmonary:      Effort: Pulmonary effort is normal. No respiratory distress.      Breath sounds: Normal breath sounds.   Abdominal:      General: Abdomen is flat. There is no distension.      Palpations: Abdomen is soft.   Musculoskeletal:      Cervical back: Normal range of motion and neck supple.   Skin:     General: Skin is warm and dry.   Neurological:      Mental Status: She is alert and oriented to person, place, and time.   Psychiatric:         Mood and Affect: Mood normal.         Behavior: Behavior normal.         Thought Content: Thought content normal.         Judgment: Judgment normal.               I have personally reviewed pertinent films in PACS and my interpretation is as follows.     MRI of right shoulder full thickness critical zone tear of supraspinatus tendon with no retraction or muscle atrophy.

## 2024-09-13 NOTE — ANESTHESIA PROCEDURE NOTES
Peripheral Block    Patient location during procedure: holding area  Start time: 9/13/2024 7:48 AM  Reason for block: at surgeon's request and post-op pain management  Staffing  Performed by: Marco Antonio Gutierrez MD  Authorized by: Marco Antonio Gutierrez MD    Preanesthetic Checklist  Completed: patient identified, IV checked, site marked, risks and benefits discussed, surgical consent, monitors and equipment checked, pre-op evaluation and timeout performed  Peripheral Block  Patient position: sitting  Prep: ChloraPrep  Patient monitoring: frequent blood pressure checks, continuous pulse oximetry and heart rate  Block type: Interscalene  Laterality: right  Injection technique: single-shot  Procedures: ultrasound guided, Ultrasound guidance required for the procedure to increase accuracy and safety of medication placement and decrease risk of complications.  Ultrasound permanent image saved    Needle  Needle type: Stimuplex   Needle gauge: 20 G  Needle length: 4 in  Needle localization: anatomical landmarks and ultrasound guidance  Assessment  Injection assessment: incremental injection, frequent aspiration, injected with ease, negative aspiration, negative for heart rate change, no paresthesia on injection, no symptoms of intraneural/intravenous injection and needle tip visualized at all times  Paresthesia pain: none  Post-procedure:  site cleaned  patient tolerated the procedure well with no immediate complications

## 2024-09-13 NOTE — ANESTHESIA POSTPROCEDURE EVALUATION
Post-Op Assessment Note    CV Status:  Stable  Pain Score: 0    Pain management: adequate       Mental Status:  Alert and awake   Hydration Status:  Euvolemic   PONV Controlled:  Controlled   Airway Patency:  Patent     Post Op Vitals Reviewed: Yes    No anethesia notable event occurred.    Staff: CRNA             /67 (09/13/24 0906)    Temp 97.7 °F (36.5 °C) (09/13/24 0906)    Pulse 81 (09/13/24 0906)   Resp 16 (09/13/24 0906)    SpO2 95 % (09/13/24 0906)

## 2024-09-13 NOTE — OP NOTE
OPERATIVE REPORT  PATIENT NAME: Lorena Phillips    :  1970  MRN: [id number]  Pt Location: [facility] OR ROOM 06    SURGERY DATE: 2024     SURGEON: Vance Knox MD     ASSISTANT: Carmen Salazar PA-C     NOTE: Carmen Salazar PA-C was present throughout the entire procedure and performed essential assistance with patient prepping, draping, positioning, suture management, wound closure, sterile dressing application and sling application, all under my direct supervision.     NOTE: No qualified resident physician was available for assistance    PREOPERATIVE DIAGNOSIS:  Right Shoulder Supraspinatus Tear    POSTOPERATIVE DIAGNOSIS: Same plus Long Head Biceps Tendon Subluxation    PROCEDURES: Surgical Arthroscopy Right Shoulder with Rotator Cuff Repair, Long Head Biceps Tenodesis, and Subacromial Decompression    ANESTHESIA STAFF: Marco Antonio Gutierrez MD     ANESTHESIA TYPE: General LMA with ultrasound guided interscalene block (Exparel). The interscalene block was provided by the anesthesia staff per my request for postoperative pain control and to decrease the use of postoperative narcotic medication for pain control.    COMPLICATIONS: None    FINDINGS: Supraspinatus Tear and Long Head Biceps Tendon Subluxation    SPECIMEN(S):  None    ESTIMATED BLOOD LOSS: Minimal    INDICATION:  Briefly, the patient is a 54 y.o.  female with right shoulder pain. MRI scan confirmed a small full thickness supraspinatus tear. The patient elected for arthroscopic treatment. Informed consent was obtained after a thorough discussion of the risks and benefits of the procedure, as well as alternatives to the procedure.     OPERATIVE TECHNIQUE:  On the day of surgery, I identified the patient’s right shoulder and marked it with my initials. The patient was taken to the operating room where anesthesia was induced and 2 grams of IV Cefazolin were given. The patient was examined in the supine position and was found to have full range of  motion of the right shoulder with no instability . The patient was then positioned in the semilateral [facility] chair position. All bony prominences were padded. The shoulder was prepped and draped in normal sterile fashion. After a time-out for safety, a standard posterolateral arthroscopic portal was made. Glenohumeral evaluation revealed mostly intact glenohumeral articular cartilage with areas of grade 2 and 3 changes of the humeral head without exposed subchondral bone.  There was a full thickness supraspinatus tear which was much larger than preoperative imaging, this tear had stented to the entire anterior posterior extent of the supraspinatus and did have retraction to the glenoid.  This did result in a disruption of the long head biceps tendon pulley long head biceps was found to be subluxated.  Subscapularis and infraspinatus were intact.  Shaving device was introduced and debridement of the humeral head articular cartilage changes to a stable border was performed.   After the intra-articular work was completed, the scope was then placed in the subacromial space through the same portal where a thorough bursectomy was performed. The full thickness supraspinatus tear was found to measure 1.4 cm from anterior to posterior and was able to be reduced to the tuberosity with reasonable tension, the most tension being the anterior aspect of the tear.  The tuberosity was prepared in routine fashion and a double row suture bridge configuration repair with one medial 2.6 mm double loaded Arthrex All-Suture anchor and one lateral 4.75 mm Arthrex BioComposite SwiveLock anchor using four stiches through the tendon in horizontal mattress fashion was performed achieving anatomic reduction of the rotator cuff tendon to the tuberosity. The long head of biceps tendon was indicated for tenodesis and it was incorporated into the rotator cuff repair by using the anterior limb of the most anterior anchor through and around  the long head of biceps in a loop whipstitch fashion; the long head of biceps was then released. When the medial row of the rotator cuff repair was tied down, this incorporated the long head biceps tenodesis into our repair. The CA ligament was frayed so it was released off the anterolateral edge of acromion and a gentle acromioplasty was performed. The area was then irrigated. Scope was withdrawn. Wounds were closed with 4-0 Monocryl and Histoacryl. Sterile dressings and a sling with an abduction pillow was placed. The patient was awoken without complication and returned to the recovery room in good condition. We will see the patient back in the office next week to initiate therapy following standard rotator cuff repair rehabilitation protocol. At the end of procedure, the counts were correct.     PATIENT DISPOSITION:  Stable to PACU      SIGNATURE: Vance Knox MD  DATE: September 13, 2024  TIME: 9:00 AM

## 2024-09-16 ENCOUNTER — EVALUATION (OUTPATIENT)
Dept: PHYSICAL THERAPY | Facility: OTHER | Age: 54
End: 2024-09-16
Payer: COMMERCIAL

## 2024-09-16 ENCOUNTER — OFFICE VISIT (OUTPATIENT)
Dept: OBGYN CLINIC | Facility: OTHER | Age: 54
End: 2024-09-16

## 2024-09-16 VITALS
DIASTOLIC BLOOD PRESSURE: 76 MMHG | HEIGHT: 62 IN | HEART RATE: 64 BPM | WEIGHT: 188 LBS | SYSTOLIC BLOOD PRESSURE: 113 MMHG | BODY MASS INDEX: 34.6 KG/M2

## 2024-09-16 DIAGNOSIS — M75.101 TEAR OF RIGHT SUPRASPINATUS TENDON: ICD-10-CM

## 2024-09-16 DIAGNOSIS — Z98.890 S/P RIGHT ROTATOR CUFF REPAIR: Primary | ICD-10-CM

## 2024-09-16 PROCEDURE — 99024 POSTOP FOLLOW-UP VISIT: CPT | Performed by: PHYSICIAN ASSISTANT

## 2024-09-16 PROCEDURE — 97161 PT EVAL LOW COMPLEX 20 MIN: CPT | Performed by: PHYSICAL THERAPIST

## 2024-09-16 PROCEDURE — 97110 THERAPEUTIC EXERCISES: CPT | Performed by: PHYSICAL THERAPIST

## 2024-09-16 PROCEDURE — 97140 MANUAL THERAPY 1/> REGIONS: CPT | Performed by: PHYSICAL THERAPIST

## 2024-09-16 NOTE — PROGRESS NOTES
"Surgery: SARS w/RCR, BT, SAD on 9/13/2024    S: Patient is doing well.  Denies acute post-op events. Denies any pain.  Sleeping modified in bed without issues.    /76   Pulse 64   Ht 5' 1.5\" (1.562 m)   Wt 85.3 kg (188 lb)   BMI 34.95 kg/m²     O: Right shoulder  Shoulder in sling  Arthroscopic portals without erythema or drainage  Steri-strips in place  Mild ecchymosis around portals  Good elbow, wrist and hand range of motion  Skin - warm and dry  SI  NVI    A/P: 3 days following arthroscopic right shoulder rotator cuff repair, bicep tenodesis and subacromial decompression  NWB RUE in sling x 6 weeks (Oct25th)  Intra-operative pictures were reviewed in detail  Formal physical therapy - following standard RCR protocol  HEP - per therapy.  For now, may start elbow/wrist/hand range of motion.  Pendulums to tolerance  Pain control - Tylenol 1000 mg every 8 hours  Ice as needed - 20 minutes on and 20 minutes off  Follow up in 8 weeks   "

## 2024-09-16 NOTE — PROGRESS NOTES
PT Evaluation     Today's date: 2024  Patient name: Lorena Phillips  : 1970  MRN: [id number]  Referring provider: Vance Knox*  Dx:   Encounter Diagnosis     ICD-10-CM    1. Tear of right supraspinatus tendon  M75.101 Ambulatory Referral to Physical Therapy          Start Time: 1500  Stop Time: 1545  Total time in clinic (min): 45 minutes    Assessment    Assessment details: Patient is 3 days s/p right RC repair and biceps tenodesis. Patient now presents with shoulder pain, limited shoulder and elbow ROM, and weakness associated with the surgical repair.  Patients current impairments limit her ability to perform ADLs like cooking and cleaning as well as participate in a regular exercise routine. Patient would benefit from PT to address these impairments and functional limitations.     Plan    Planned therapy interventions: joint mobilization, kinesiology taping, neuromuscular re-education, therapeutic exercise and therapeutic training          Subjective Evaluation    History of Present Illness  Mechanism of injury: Patient reports 2 years of shoulder pain leading up to her surgery. She notes a fall a few months back which increased shoulder pain in which she sought a surgical consult.  Patient now reports mild to moderate shoulder pain that has been controled with acetaminophen    Quality of life: good    Patient Goals  Patient goals for therapy: independence with ADLs/IADLs  Patient goal: exercise for physical fitness and health  Pain  Current pain rating: 3  At best pain ratin  At worst pain ratin        Objective     Active Range of Motion     Right Elbow   Extension: -40 degrees     Passive Range of Motion     Right Shoulder   Flexion: 65 degrees   Abduction: 45 degrees   External rotation 0°: 5 degrees   Internal rotation 0°: 15 degrees              Precautions: RCR  standard protocol,  PROM only 6 weeks. 90 degrees of flexion, 60 degrees of abduction, 30 degrees of ER at  "0, IR to the body       Manuals 9/16                         PROM                                       Neuro Re-Ed                                                                                                        Ther Ex                                       Iso scap retraction 10 x 5\"            Elbow/ wrist AROM 10 x 5\"                                                                Ther Activity                                       Gait Training                                       Modalities                                            "

## 2024-09-16 NOTE — PATIENT INSTRUCTIONS
Rotator Cuff Repair Rehabilitation Protocol  (adapted from Horacio SEGURA et al. “Rehabilitation of the Rotator Cuff: An Evaluation Based Approach”. JAAOS 2006; 14:599-609)  Updated 10.14  Vance Knox MD  [facility]  [address]  [place] [address]  [phone]  Phase 1 (Weeks 0-6)   Immediate Postoperative Period   Goals:    Diminish Pain and Inflammation  Maintain and Protect Integrity of the Repair    Gradually Increase Passive ROM (NO Active or Active Assist until Week 6)    Become Independent with Modified ADLs   Precautions:  Maintain Arm in Abduction Sling, Remove Only for Directed Exercises (may remove Abduction Pillow after Day 21 for comfort)    Keep Incisions Clean & Dry (okay to shower in 48 hours, band-aids over incisions)  No Immersion (pool) until Wounds Totally Sealed (usually not prior to day #10)  Passive Shoulder Motion ONLY, No Lifting/Holding Objects, Reaching Behind Back  Okay to Type/Write at Desktop with Arm in Sling   Day 0-6    Elbow, Wrist, Hand AROM Exercises     Start Cervical AROM and Scapula Isometrics    Cryotherapy/Ice for Pain and Inflammation    Instruct in Hygiene, Posture, and Positioning    Day 7-28    Continue Above  May Start Pendulum Exercises    May Start Supine, Pain Free, PT assisted PROM  Forward Flexion to 90°, External Rotation to 35° (Elbow at side), Internal Rotation to Body, Abduction to 60°    Can Introduce light Cardio (Walking, Stationary Bike)    Aqua Therapy may begin at week 3 (day 21) as long as no wound problems   Day 29-42    Continue Above  Progress PROM to Goal of full PROM by Week 6.  May add Gentle Mobilizations (GH and Scapulothoracic) to Regain full PROM if Needed.  May add Heat prior to PROM Exercises, Ice after Exercises  May Begin AAROM at Day 29 if ROM is Appropriate in Anticipation of AROM Starting at Week 6   Criteria to Progress to Phase 2    Reasonable Passive Forward Flexion, Abduction ,  IR/ER    Time  Phase 2 (Weeks 6-12)   Protection and Active Motion   Goals of Phase:    Allow Healing of Soft Tissues    Decrease Pain and Inflammation  Add ADLs and Regain AROM by End of Phase   Precautions:    NO STRENGTHENING until Phase 3    Repair is Most Prone to Failure during this Phase!    No Lifting Objects > 2 lbs (Coffee Cup OK), no Sudden Motions    Avoid Upper Extremity Bike and Ergometer   Day 43-56    Discontinue Sling  Initiate AROM Exercises (forward flexion, ER, IR and abduction), Rotator Cuff Isometrics    Continue Periscapular Exercises, add Stretching if PROM Lacking   No Strengthening until Week 12 (Minimum Time Needed for Cuff Healing Sufficient to withstand Strengthening)     Phase 3 (Weeks 12-16)   Early Strengthening   Goal of Phase:    Gain full AROM, Maintain PROM    Gradual return of Shoulder Strength, Power and Endurance    Gradual return to Functional Activities   Precautions:    No Lifting > 10lbs, Sudden Lifting or Pushing activities, Overhead Lifting    No Upper Extremity Bike or Ergometer   Week 12    Initiate Strengthening Program (10 lb Maximum until Phase 4)      ER/IR with Bands (Standing)      ER in Lateral Decubitius Position      Lateral Raises      Full Can in Scapular Plane      Prone Rowing, Horizontal Abduction, Extension      Elbow Flexion/Extension   Week 14-16    Initiate light Functional Activities as Permitted  Progress to Fundamental Shoulder Exercises  Phase 4 (Variable but Weeks 16-24)   Aggressive Rehab  Sport Specific or Activity Specific    Goals of Phase:    Maintain Full Pain-free AROM    Advanced Conditioning Exercises for enhanced Functional use    Continue regaining Shoulder Strength, Power and Endurance    Eventual return to full Functional Activities   Precautions:    None   Week 16    Continue ROM and stretching if appropriate    Progress Strengthening, Proprioceptive and Neuromuscular Training    Light Sports if Progressing Well (Chipping/Putting,  easy ground strokes etc.)   Week 20    Continue Strengthening and Stretching    Initiate Interval Sports Program as Appropriate    Note:   This is a general program which may be modified based on intra-operative findings, additional procedures preformed, repair stability, and patient biological factors.  If in doubt, please check with my office for individual patient specifics.  The ultimate goal of the surgery and rehabilitation is to get the rotator cuff to heal with the least residual functional deficit due to stiffness.  That being said, I would rather have a stiff shoulder with a healed rotator cuff repair, than a loose shoulder with a failed repair!

## 2024-09-19 ENCOUNTER — OFFICE VISIT (OUTPATIENT)
Dept: PHYSICAL THERAPY | Facility: OTHER | Age: 54
End: 2024-09-19
Payer: COMMERCIAL

## 2024-09-19 DIAGNOSIS — M75.101 TEAR OF RIGHT SUPRASPINATUS TENDON: Primary | ICD-10-CM

## 2024-09-19 PROCEDURE — 97110 THERAPEUTIC EXERCISES: CPT

## 2024-09-19 PROCEDURE — 97140 MANUAL THERAPY 1/> REGIONS: CPT

## 2024-09-19 NOTE — PROGRESS NOTES
"Daily Note     Today's date: 2024  Patient name: Lorena Phillips  : 1970  MRN: [id number]  Referring provider: Vance Knox*  Dx:   Encounter Diagnosis     ICD-10-CM    1. Tear of right supraspinatus tendon  M75.101               1 on 1 with PT JRS 4:30 - 5:00       Subjective: Patient reports minimal anterior shoulder soreness after session on Monday.  She has no questions about HEP and states she is feeling good so far post-surgery.        Objective: See treatment diary below.  Reviewed HEP.        Assessment: Tolerated treatment well. She has good understanding of limitations of post-surgical protocol and is independent with HEP at this time.  Patient would benefit from continued PT      Plan: Continue plan of care per protocol.       Precautions: RCR  standard protocol,  PROM only 6 weeks. 90 degrees of flexion, 60 degrees of abduction, 30 degrees of ER at 0, IR to the body       Manuals 24                        PROM  JRS in shoulder flexion, abduction, and ER                                     Neuro Re-Ed  24                                                                                                      Ther Ex  24                                     Iso scap retraction 10 x 5\" 10 x 5\"           Elbow/ wrist AROM 10 x 5\" AAROM X 30 each direction                                                               Ther Activity  24                                     Gait Training                                       Modalities                                            "
Have You Had A Facial Before?: has had a previous facial
When Outside In The Sun, Do You...: mostly tans, rarely burns
When Was Your Last Facial?: 01/07/2021

## 2024-09-23 ENCOUNTER — OFFICE VISIT (OUTPATIENT)
Dept: PHYSICAL THERAPY | Facility: OTHER | Age: 54
End: 2024-09-23
Payer: COMMERCIAL

## 2024-09-23 DIAGNOSIS — M75.101 TEAR OF RIGHT SUPRASPINATUS TENDON: Primary | ICD-10-CM

## 2024-09-23 PROCEDURE — 97140 MANUAL THERAPY 1/> REGIONS: CPT | Performed by: PHYSICAL THERAPIST

## 2024-09-23 NOTE — PROGRESS NOTES
"Daily Note     Today's date: 2024  Patient name: Lorena Phillips  : 1970  MRN: [id number]  Referring provider: Vance Knox*  Dx:   Encounter Diagnosis     ICD-10-CM    1. Tear of right supraspinatus tendon  M75.101             Start Time: 1425  Stop Time: 1445  Total time in clinic (min): 20 minutes    Subjective: Patient reports shoulder continues to improve.       Objective: See treatment diary below.  Reviewed HEP.        Assessment: Tolerated treatment well. Patient PROM is within limits of post operative protocol.   Patient would benefit from continued PT      Plan: Continue plan of care per protocol.       Precautions: RCR  standard protocol,  PROM only 6 weeks. 90 degrees of flexion, 60 degrees of abduction, 30 degrees of ER at 0, IR to the body       Manuals 24                       PROM  JRS in shoulder flexion, abduction, and ER MJS  PROM                                     Neuro Re-Ed  24                                                                                                      Ther Ex  24                                     Iso scap retraction 10 x 5\" 10 x 5\"           Elbow/ wrist AROM 10 x 5\" AAROM X 30 each direction AROM x 30                                                               Ther Activity  24                                     Gait Training                                       Modalities                                            "

## 2024-09-30 ENCOUNTER — OFFICE VISIT (OUTPATIENT)
Dept: PHYSICAL THERAPY | Facility: OTHER | Age: 54
End: 2024-09-30
Payer: COMMERCIAL

## 2024-09-30 DIAGNOSIS — M75.101 TEAR OF RIGHT SUPRASPINATUS TENDON: Primary | ICD-10-CM

## 2024-09-30 PROCEDURE — 97110 THERAPEUTIC EXERCISES: CPT | Performed by: PHYSICAL THERAPIST

## 2024-09-30 PROCEDURE — 97140 MANUAL THERAPY 1/> REGIONS: CPT | Performed by: PHYSICAL THERAPIST

## 2024-09-30 NOTE — PROGRESS NOTES
"Daily Note     Today's date: 2024  Patient name: Lorena Phillips  : 1970  MRN: [id number]  Referring provider: Vance Knox*  Dx:   No diagnosis found.                   Subjective: Patient reports shoulder continues to improve.       Objective: See treatment diary below.  Reviewed HEP.        Assessment: Tolerated treatment well. Patient PROM is within limits of post operative protocol.   Patient would benefit from continued PT      Plan: Continue plan of care per protocol.       Precautions: RCR  standard protocol,  PROM only 6 weeks. 90 degrees of flexion, 60 degrees of abduction, 30 degrees of ER at 0, IR to the body       Manuals 24                      PROM  JRS in shoulder flexion, abduction, and ER MJS  PROM  MJS PROM                                   Neuro Re-Ed  24                                                                                                      Ther Ex  24                                     Iso scap retraction 10 x 5\" 10 x 5\"  2 x 10 x 5\"         Elbow/ wrist AROM 10 x 5\" AAROM X 30 each direction AROM x 30  AROM x 30                                                             Ther Activity  24                                     Gait Training                                       Modalities                                            "

## 2024-10-01 DIAGNOSIS — E66.01 CLASS 2 SEVERE OBESITY DUE TO EXCESS CALORIES WITH SERIOUS COMORBIDITY AND BODY MASS INDEX (BMI) OF 35.0 TO 35.9 IN ADULT (HCC): ICD-10-CM

## 2024-10-01 DIAGNOSIS — E66.812 CLASS 2 SEVERE OBESITY DUE TO EXCESS CALORIES WITH SERIOUS COMORBIDITY AND BODY MASS INDEX (BMI) OF 35.0 TO 35.9 IN ADULT (HCC): ICD-10-CM

## 2024-10-01 DIAGNOSIS — E11.40 TYPE 2 DIABETES MELLITUS WITH DIABETIC NEUROPATHY, WITHOUT LONG-TERM CURRENT USE OF INSULIN (HCC): Primary | ICD-10-CM

## 2024-10-01 RX ORDER — TIRZEPATIDE 5 MG/.5ML
5 INJECTION, SOLUTION SUBCUTANEOUS WEEKLY
Qty: 2 ML | Refills: 3 | Status: SHIPPED | OUTPATIENT
Start: 2024-10-01

## 2024-10-09 ENCOUNTER — OFFICE VISIT (OUTPATIENT)
Dept: PHYSICAL THERAPY | Facility: OTHER | Age: 54
End: 2024-10-09
Payer: COMMERCIAL

## 2024-10-09 DIAGNOSIS — M75.101 TEAR OF RIGHT SUPRASPINATUS TENDON: Primary | ICD-10-CM

## 2024-10-09 PROCEDURE — 97140 MANUAL THERAPY 1/> REGIONS: CPT | Performed by: PHYSICAL THERAPIST

## 2024-10-09 NOTE — PROGRESS NOTES
"Daily Note     Today's date: 10/9/2024  Patient name: Lorena Phillips  : 1970  MRN: [id number]  Referring provider: Vance Knox*  Dx:   Encounter Diagnosis     ICD-10-CM    1. Tear of right supraspinatus tendon  M75.101               Start Time: 1530  Stop Time: 1555  Total time in clinic (min): 25 minutes    Subjective: Patient reports shoulder continues to improve.       Objective: See treatment diary below.  Reviewed HEP.        Assessment: Tolerated treatment well. Patient PROM is is within the limits of the post operative protocol.  Patient reports improvements in resting symptoms.       Plan: Continue plan of care per protocol.       Precautions: RCR  standard protocol,  PROM only 6 weeks. 90 degrees of flexion, 60 degrees of abduction, 30 degrees of ER at 0, IR to the body       Manuals 9/16 9/19/24 9/23 9/30 10/9                     PROM  JRS in shoulder flexion, abduction, and ER MJS  PROM  MJS PROM MJS PROM                                  Neuro Re-Ed  24                                                                                                      Ther Ex  24                                     Iso scap retraction 10 x 5\" 10 x 5\"  2 x 10 x 5\" 3 x 10 x 5\"        Elbow/ wrist AROM 10 x 5\" AAROM X 30 each direction AROM x 30  AROM x 30 AROM x 30                                                            Ther Activity  24                                     Gait Training                                       Modalities                                            "

## 2024-10-16 ENCOUNTER — OFFICE VISIT (OUTPATIENT)
Dept: PHYSICAL THERAPY | Facility: OTHER | Age: 54
End: 2024-10-16
Payer: COMMERCIAL

## 2024-10-16 ENCOUNTER — OFFICE VISIT (OUTPATIENT)
Dept: PODIATRY | Facility: CLINIC | Age: 54
End: 2024-10-16
Payer: COMMERCIAL

## 2024-10-16 DIAGNOSIS — E11.40 TYPE 2 DIABETES MELLITUS WITH DIABETIC NEUROPATHY, WITHOUT LONG-TERM CURRENT USE OF INSULIN (HCC): Primary | ICD-10-CM

## 2024-10-16 DIAGNOSIS — M75.101 TEAR OF RIGHT SUPRASPINATUS TENDON: Primary | ICD-10-CM

## 2024-10-16 PROCEDURE — 97140 MANUAL THERAPY 1/> REGIONS: CPT | Performed by: PHYSICAL THERAPIST

## 2024-10-16 PROCEDURE — 97110 THERAPEUTIC EXERCISES: CPT | Performed by: PHYSICAL THERAPIST

## 2024-10-16 PROCEDURE — 99212 OFFICE O/P EST SF 10 MIN: CPT | Performed by: PODIATRIST

## 2024-10-16 NOTE — PROGRESS NOTES
Ambulatory Visit  Name: Lorena Phillips      : 1970      MRN: [id number]  Encounter Provider: Vance Collier DPM  Encounter Date: 10/16/2024   Encounter department: [facility] PODIATRY [place]    Assessment & Plan  Type 2 diabetes mellitus with diabetic neuropathy, without long-term current use of insulin ([facility])    Lab Results   Component Value Date    HGBA1C 6.3 (H) 2024       Orders:    Diabetic Shoe    Diabetic Shoe Inserts      History of Present Illness     Lorena Phillips is a 54 y.o. female who presents for her yearly diabetic foot exam.  She relates that her sugars become more stabilized in the 90s since her last visit.  She is applying lotion several times a week to both feet.    History obtained from : patient  Review of Systems  Medical History Reviewed by provider this encounter:       Current Outpatient Medications on File Prior to Visit   Medication Sig Dispense Refill    bromocriptine (PARLODEL) 2.5 mg tablet Take 1 tablet (2.5 mg total) by mouth daily 90 tablet 1    cetirizine (ZyrTEC) 10 MG chewable tablet Chew 10 mg daily      estradiol (ESTRACE) 1 mg tablet Take 1 tablet (1 mg total) by mouth daily 90 tablet 4    ezetimibe (ZETIA) 10 mg tablet TAKE 1 TABLET BY MOUTH DAILY. 90 tablet 1    fenofibrate (TRICOR) 145 mg tablet Take 1 tablet (145 mg total) by mouth daily 90 tablet 1    gabapentin (NEURONTIN) 100 mg capsule Take 2 capsules (200 mg total) by mouth 2 (two) times a day (Patient taking differently: Take 200 mg by mouth 2 (two) times a day 200mg dinner 300mg at bedtime) 360 capsule 1    Icosapent Ethyl (Vascepa) 1 g CAPS Take 2 capsules (2 g total) by mouth 2 (two) times a day (Patient taking differently: Take 2 g by mouth in the morning) 360 capsule 3    multivitamin (THERAGRAN) TABS Take 1 tablet by mouth daily      omeprazole (PriLOSEC) 20 mg delayed release capsule Take 1 capsule (20 mg total) by mouth daily 90 capsule 1    Tirzepatide-Weight Management (Zepbound)  5 MG/0.5ML SOLN Inject 5 mg under the skin once a week 2 mL 3    valACYclovir (VALTREX) 1,000 mg tablet Take 1 tablet (1,000 mg total) by mouth 2 (two) times a day for 5 days (Patient taking differently: Take 1,000 mg by mouth if needed) 10 tablet 0    Vitamin D, Cholecalciferol, 50 MCG (2000 UT) CAPS Take by mouth      [DISCONTINUED] dicyclomine (BENTYL) 10 mg capsule TAKE 1 CAPSULE BY MOUTH TWO TIMES A DAY. (Patient taking differently: if needed) 180 capsule 1    [DISCONTINUED] metFORMIN (GLUCOPHAGE-XR) 500 mg 24 hr tablet Take 1 tablet (500 mg total) by mouth daily with breakfast 90 tablet 3    [DISCONTINUED] omega-3-acid ethyl esters (LOVAZA) 1 g capsule Take 2 g by mouth daily 2caps daily      [DISCONTINUED] oxyCODONE (ROXICODONE) 5 immediate release tablet Take 1 tablet (5 mg total) by mouth every 6 (six) hours as needed for severe pain for up to 13 doses Max Daily Amount: 20 mg 13 tablet 0     No current facility-administered medications on file prior to visit.      Social History     Tobacco Use    Smoking status: Never     Passive exposure: Never    Smokeless tobacco: Never   Vaping Use    Vaping status: Never Used   Substance and Sexual Activity    Alcohol use: Not Currently    Drug use: Never    Sexual activity: Yes     Partners: Female     Birth control/protection: Post-menopausal         Objective     There were no vitals taken for this visit.    Physical Exam  Cardiovascular:      Pulses: Pulses are weak.           Dorsalis pedis pulses are 2+ on the right side and 2+ on the left side.        Posterior tibial pulses are 1+ on the right side and 1+ on the left side.   Feet:      Right foot:      Skin integrity: No ulcer, skin breakdown, erythema, warmth, callus or dry skin.      Left foot:      Skin integrity: No ulcer, skin breakdown, erythema, warmth, callus or dry skin.       Vascular status is 1/4 DP PT negative digital hair normal distal cooling immediate capillary refill bilaterally.  The  capillary refill is approximately 2 seconds.    Derm no pathology is noted the nails are clear and have no increase in thickness or discoloration noted.  There is no dry skin present on the heels or the plantar aspect of both feet.    Ortho mild hammertoe deformities present on the fifth digits bilaterally which are in adductovarus position.    Neuro light touch is intact and 0.5 monofilament is also intact.  The sites that were tested were the plantar aspect of the hallux, plantar aspect of the third and fourth toes, submet 3, and the plantar aspect of the arch.  Patient's neuropathy is secondary to numbness in both feet.    Diabetic Foot Exam    Patient's shoes and socks removed.    Right Foot/Ankle   Right Foot Inspection  Skin Exam: skin normal. Skin not intact, no dry skin, no warmth, no callus, no erythema, no maceration, no abnormal color, no pre-ulcer, no ulcer and no callus.     Toe Exam: ROM and strength within normal limits. No swelling, no tenderness, erythema and  no right toe deformity    Sensory   Vibration: intact  Proprioception: intact  Monofilament testing: intact    Vascular  Capillary refills: < 3 seconds  The right DP pulse is 2+. The right PT pulse is 1+.     Left Foot/Ankle  Left Foot Inspection  Skin Exam: skin normal. Skin not intact, no dry skin, no warmth, no erythema, no maceration, normal color, no pre-ulcer, no ulcer and no callus.     Toe Exam: ROM and strength within normal limits. No swelling, no tenderness, no erythema and no left toe deformity.     Sensory   Vibration: intact  Proprioception: intact  Monofilament testing: intact    Vascular  Capillary refills: < 3 seconds  The left DP pulse is 2+. The left PT pulse is 1+.     Assign Risk Category  No deformity present  No loss of protective sensation  Weak pulses  Risk: 0    Administrative Statements   I have spent a total time of 12 minutes in caring for this patient on the day of the visit/encounter including Risks and benefits  of tx options, Instructions for management, Patient and family education, Importance of tx compliance, Risk factor reductions, Counseling / Coordination of care, Documenting in the medical record, Reviewing / ordering tests, medicine, procedures  , and Obtaining or reviewing history  .

## 2024-10-16 NOTE — PROGRESS NOTES
"Daily Note     Today's date: 10/16/2024  Patient name: Lorena Phillips  : 1970  MRN: [id number]  Referring provider: Vance Knox*  Dx:   Encounter Diagnosis     ICD-10-CM    1. Tear of right supraspinatus tendon  M75.101                 Start Time: 1430  Stop Time: 1500  Total time in clinic (min): 30 minutes    Subjective: Patient reports shoulder continues to improve.       Objective: See treatment diary below.  Reviewed HEP.        Assessment: Tolerated treatment well. Patient PROM is is within the limits of the post operative protocol.  Patient reports improvements in resting symptoms.       Plan: Continue plan of care per protocol.       Precautions: RCR  standard protocol,  PROM only 6 weeks. 90 degrees of flexion, 60 degrees of abduction, 30 degrees of ER at 0, IR to the body       Manuals 9/16 9/19/24 9/23 9/30 10/9 10/16                    PROM  JRS in shoulder flexion, abduction, and ER MJS  PROM  MJS PROM MJS PROM Mjs PROM                                 Neuro Re-Ed  24                                                                                                      Ther Ex  24                                     Iso scap retraction 10 x 5\" 10 x 5\"  2 x 10 x 5\" 3 x 10 x 5\"        Elbow/ wrist AROM 10 x 5\" AAROM X 30 each direction AROM x 30  AROM x 30 AROM x 30        AAROM       10 flex, ER                                               Ther Activity  24                                     Gait Training                                       Modalities                                            "

## 2024-10-16 NOTE — ASSESSMENT & PLAN NOTE
Lab Results   Component Value Date    HGBA1C 6.3 (H) 06/25/2024       Orders:    Diabetic Shoe    Diabetic Shoe Inserts

## 2024-10-20 ENCOUNTER — APPOINTMENT (OUTPATIENT)
Dept: LAB | Age: 54
End: 2024-10-20
Payer: COMMERCIAL

## 2024-10-20 DIAGNOSIS — E78.5 HYPERLIPIDEMIA, UNSPECIFIED HYPERLIPIDEMIA TYPE: ICD-10-CM

## 2024-10-20 DIAGNOSIS — E11.40 TYPE 2 DIABETES MELLITUS WITH DIABETIC NEUROPATHY, WITHOUT LONG-TERM CURRENT USE OF INSULIN (HCC): ICD-10-CM

## 2024-10-20 LAB
ALBUMIN SERPL BCG-MCNC: 4.3 G/DL (ref 3.5–5)
ALP SERPL-CCNC: 36 U/L (ref 34–104)
ALT SERPL W P-5'-P-CCNC: 12 U/L (ref 7–52)
ANION GAP SERPL CALCULATED.3IONS-SCNC: 7 MMOL/L (ref 4–13)
AST SERPL W P-5'-P-CCNC: 20 U/L (ref 13–39)
BILIRUB SERPL-MCNC: 0.4 MG/DL (ref 0.2–1)
BUN SERPL-MCNC: 17 MG/DL (ref 5–25)
CALCIUM SERPL-MCNC: 8.8 MG/DL (ref 8.4–10.2)
CHLORIDE SERPL-SCNC: 104 MMOL/L (ref 96–108)
CHOLEST SERPL-MCNC: 139 MG/DL
CO2 SERPL-SCNC: 30 MMOL/L (ref 21–32)
CREAT SERPL-MCNC: 0.73 MG/DL (ref 0.6–1.3)
EST. AVERAGE GLUCOSE BLD GHB EST-MCNC: 117 MG/DL
GFR SERPL CREATININE-BSD FRML MDRD: 92 ML/MIN/1.73SQ M
GLUCOSE P FAST SERPL-MCNC: 87 MG/DL (ref 65–99)
HBA1C MFR BLD: 5.7 %
HDLC SERPL-MCNC: 44 MG/DL
LDLC SERPL CALC-MCNC: 74 MG/DL (ref 0–100)
NONHDLC SERPL-MCNC: 95 MG/DL
POTASSIUM SERPL-SCNC: 3.9 MMOL/L (ref 3.5–5.3)
PROT SERPL-MCNC: 7 G/DL (ref 6.4–8.4)
SODIUM SERPL-SCNC: 141 MMOL/L (ref 135–147)
TRIGL SERPL-MCNC: 107 MG/DL

## 2024-10-20 PROCEDURE — 36415 COLL VENOUS BLD VENIPUNCTURE: CPT

## 2024-10-20 PROCEDURE — 80053 COMPREHEN METABOLIC PANEL: CPT

## 2024-10-20 PROCEDURE — 83036 HEMOGLOBIN GLYCOSYLATED A1C: CPT

## 2024-10-20 PROCEDURE — 80061 LIPID PANEL: CPT

## 2024-10-21 ENCOUNTER — OFFICE VISIT (OUTPATIENT)
Age: 54
End: 2024-10-21
Payer: COMMERCIAL

## 2024-10-21 VITALS
SYSTOLIC BLOOD PRESSURE: 132 MMHG | BODY MASS INDEX: 32.46 KG/M2 | TEMPERATURE: 96.9 F | DIASTOLIC BLOOD PRESSURE: 80 MMHG | HEIGHT: 62 IN | HEART RATE: 61 BPM | WEIGHT: 176.4 LBS | OXYGEN SATURATION: 99 %

## 2024-10-21 DIAGNOSIS — K29.70 GASTRITIS WITHOUT BLEEDING, UNSPECIFIED CHRONICITY, UNSPECIFIED GASTRITIS TYPE: ICD-10-CM

## 2024-10-21 DIAGNOSIS — M75.101 TEAR OF RIGHT SUPRASPINATUS TENDON: ICD-10-CM

## 2024-10-21 DIAGNOSIS — E78.1 HYPERTRIGLYCERIDEMIA: ICD-10-CM

## 2024-10-21 DIAGNOSIS — E78.5 HYPERLIPIDEMIA, UNSPECIFIED HYPERLIPIDEMIA TYPE: ICD-10-CM

## 2024-10-21 DIAGNOSIS — E11.40 TYPE 2 DIABETES MELLITUS WITH DIABETIC NEUROPATHY, WITHOUT LONG-TERM CURRENT USE OF INSULIN (HCC): Primary | ICD-10-CM

## 2024-10-21 PROCEDURE — 99214 OFFICE O/P EST MOD 30 MIN: CPT | Performed by: INTERNAL MEDICINE

## 2024-10-21 RX ORDER — EZETIMIBE 10 MG/1
10 TABLET ORAL DAILY
Qty: 90 TABLET | Refills: 1 | Status: SHIPPED | OUTPATIENT
Start: 2024-10-21

## 2024-10-21 RX ORDER — FENOFIBRATE 145 MG/1
145 TABLET, COATED ORAL DAILY
Qty: 90 TABLET | Refills: 1 | Status: SHIPPED | OUTPATIENT
Start: 2024-10-21 | End: 2025-10-21

## 2024-10-21 RX ORDER — ICOSAPENT ETHYL 1 G/1
2 CAPSULE ORAL DAILY
Qty: 180 CAPSULE | Refills: 2 | Status: SHIPPED | OUTPATIENT
Start: 2024-10-21

## 2024-10-21 NOTE — ASSESSMENT & PLAN NOTE
Type 2 diabetes reviewed today remains under good control globin A1c of 5.7% confirming good control is now on Zepbound for weight reduction program continue to monitor blood sugars.  Lab Results   Component Value Date    HGBA1C 5.7 (H) 10/20/2024       Orders:    Comprehensive metabolic panel; Future    Hemoglobin A1C; Future

## 2024-10-21 NOTE — PROGRESS NOTES
Ambulatory Visit  Name: Lorena Phillips      : 1970      MRN: [id number]  Encounter Provider: Yo Tanner MD  Encounter Date: 10/21/2024   Encounter department: [facility] INTERNAL MEDICINE    Assessment & Plan  Hypertriglyceridemia  Triglyceride values reviewed with the patient shows significant improvement recommend continuation of low carbohydrate diet along with Tricor 145 mg daily    Orders:    Icosapent Ethyl (Vascepa) 1 g CAPS; Take 2 capsules (2 g total) by mouth in the morning    Lipid panel; Future    Hyperlipidemia, unspecified hyperlipidemia type  Lipid profile reviewed with the patient shows significant improvements recommend continuation of Zetia 10 mg daily and Vascepa 2 g daily along with low-cholesterol diet    Orders:    ezetimibe (ZETIA) 10 mg tablet; Take 1 tablet (10 mg total) by mouth daily    fenofibrate (TRICOR) 145 mg tablet; Take 1 tablet (145 mg total) by mouth daily    Lipid panel; Future    Type 2 diabetes mellitus with diabetic neuropathy, without long-term current use of insulin (HCC)  Type 2 diabetes reviewed today remains under good control globin A1c of 5.7% confirming good control is now on Zepbound for weight reduction program continue to monitor blood sugars.  Lab Results   Component Value Date    HGBA1C 5.7 (H) 10/20/2024       Orders:    Comprehensive metabolic panel; Future    Hemoglobin A1C; Future    Gastritis without bleeding, unspecified chronicity, unspecified gastritis type  Patient relates that her gastritis symptoms have improved significantly inquired if we can try to stop the omeprazole which I agreed that we can try that and see whether or not she has any relapse of symptoms she knows that if gastritis or acid reflux symptoms reappear to resume the omeprazole at 20 mg prior to breakfast.         Tear of right supraspinatus tendon  Status post repair of right supraspinatus tendon by surgery patient is doing well on her recovery course now  starting physical therapy under the direction of her orthopedic surgeon              History of Present Illness     This pleasant 54-year-old female patient returns to our office today for review of her routine blood work and evaluation of her blood pressure.  Since her last visit with us she has undergone surgery to repair her rotator cuff tear on her right shoulder.  She indicates that the recovery is going well and she has started physical therapy.      Review of Systems   All other systems reviewed and are negative.    Past Medical History:   Diagnosis Date    Allergic     seasonal    Anxiety     CPAP (continuous positive airway pressure) dependence     Diabetes mellitus (HCC)     GERD (gastroesophageal reflux disease)     Hypertension     Sleep apnea      Past Surgical History:   Procedure Laterality Date    BELPHAROPTOSIS REPAIR Bilateral     2022    CARPAL TUNNEL RELEASE Bilateral     COLONOSCOPY  2018    10 years per pt    EGD      ENDOMETRIAL ABLATION W/ NOVASURE      HYSTERECTOMY  2006    still has 1 ovary-?side    NASAL SEPTUM SURGERY      [place] SURGICAL ARTHROSCOPY SHOULDER W/ROTATOR CUFF RPR Right 2024    Procedure: SHOULDER ARTHROSCOPIC ROTATOR CUFF REPAIR, SUBACROMIAL DECOMPRESSION, BICEP TENODESIS;  Surgeon: Vance Knox MD;  [facility]: [facility] MAIN OR;  Service: Orthopedics     Family History   Problem Relation Age of Onset    Anxiety disorder Mother     Stroke Mother     Lymphoma Father     Hypertension Father             Diabetes Father             Cancer Father     No Known Problems Sister     Stomach cancer Maternal Grandmother 48    Cancer Maternal Grandmother     Lung cancer Maternal Grandfather 80    Cancer Maternal Grandfather     No Known Problems Paternal Grandmother     Heart disease Paternal Grandfather     No Known Problems Maternal Aunt     No Known Problems Maternal Aunt     No Known Problems Paternal Aunt     No Known Problems  Paternal Aunt     Leukemia Paternal Uncle 39    Lung cancer Paternal Uncle 75    Coronary artery disease Paternal Aunt     Thyroid disease Paternal Aunt     Diabetes Brother         Type 2     Social History     Tobacco Use    Smoking status: Never     Passive exposure: Never    Smokeless tobacco: Never   Vaping Use    Vaping status: Never Used   Substance and Sexual Activity    Alcohol use: Not Currently    Drug use: Never    Sexual activity: Yes     Partners: Female     Birth control/protection: Post-menopausal     Current Outpatient Medications on File Prior to Visit   Medication Sig    bromocriptine (PARLODEL) 2.5 mg tablet Take 1 tablet (2.5 mg total) by mouth daily    cetirizine (ZyrTEC) 10 MG chewable tablet Chew 10 mg daily    estradiol (ESTRACE) 1 mg tablet Take 1 tablet (1 mg total) by mouth daily    gabapentin (NEURONTIN) 100 mg capsule Take 2 capsules (200 mg total) by mouth 2 (two) times a day (Patient taking differently: Take 200 mg by mouth 2 (two) times a day 200mg dinner 300mg at bedtime)    multivitamin (THERAGRAN) TABS Take 1 tablet by mouth daily    Tirzepatide-Weight Management (Zepbound) 5 MG/0.5ML SOLN Inject 5 mg under the skin once a week    Vitamin D, Cholecalciferol, 50 MCG (2000 UT) CAPS Take by mouth    [DISCONTINUED] ezetimibe (ZETIA) 10 mg tablet TAKE 1 TABLET BY MOUTH DAILY.    [DISCONTINUED] fenofibrate (TRICOR) 145 mg tablet Take 1 tablet (145 mg total) by mouth daily    [DISCONTINUED] Icosapent Ethyl (Vascepa) 1 g CAPS Take 2 capsules (2 g total) by mouth 2 (two) times a day (Patient taking differently: Take 2 g by mouth in the morning)    [DISCONTINUED] omeprazole (PriLOSEC) 20 mg delayed release capsule Take 1 capsule (20 mg total) by mouth daily    valACYclovir (VALTREX) 1,000 mg tablet Take 1 tablet (1,000 mg total) by mouth 2 (two) times a day for 5 days (Patient taking differently: Take 1,000 mg by mouth if needed)     Allergies   Allergen Reactions    Statins Irritability,  "Lightheadedness and Other (See Comments)     Immunization History   Administered Date(s) Administered    COVID-19 MODERNA VACC 0.5 ML IM 01/13/2021, 02/10/2021, 06/16/2022    INFLUENZA 10/29/2013, 10/15/2014, 10/01/2015, 10/10/2016, 10/13/2017, 11/02/2018, 10/01/2019, 10/23/2020, 11/04/2021, 10/25/2022, 10/13/2023    Influenza, injectable, quadrivalent, preservative free 0.5 mL 10/13/2023    Pneumococcal Polysaccharide PPV23 02/26/2020    Tdap 04/04/2019    Tuberculin Skin Test-PPD Intradermal 11/03/2010, 10/13/2017, 04/18/2022, 08/13/2024    Zoster Vaccine Recombinant 04/19/2019, 07/05/2019     Objective     /80   Pulse 61   Temp (!) 96.9 °F (36.1 °C) (Tympanic)   Ht 5' 1.5\" (1.562 m)   Wt 80 kg (176 lb 6.4 oz)   SpO2 99%   BMI 32.79 kg/m²     Physical Exam  Vitals and nursing note reviewed.   Constitutional:       General: She is not in acute distress.     Appearance: She is well-developed.   HENT:      Head: Normocephalic and atraumatic.   Eyes:      Conjunctiva/sclera: Conjunctivae normal.   Cardiovascular:      Rate and Rhythm: Normal rate and regular rhythm.      Heart sounds: Normal heart sounds. No murmur heard.  Pulmonary:      Effort: Pulmonary effort is normal. No respiratory distress.      Breath sounds: Normal breath sounds. No wheezing, rhonchi or rales.   Abdominal:      Palpations: Abdomen is soft.   Musculoskeletal:         General: No swelling.      Cervical back: Neck supple.      Right lower leg: No edema.      Left lower leg: No edema.      Comments: Right arm in an immobilizer device   Skin:     General: Skin is warm and dry.      Capillary Refill: Capillary refill takes less than 2 seconds.   Neurological:      General: No focal deficit present.      Mental Status: She is alert. Mental status is at baseline.   Psychiatric:         Mood and Affect: Mood normal.         "

## 2024-10-21 NOTE — ASSESSMENT & PLAN NOTE
Triglyceride values reviewed with the patient shows significant improvement recommend continuation of low carbohydrate diet along with Tricor 145 mg daily    Orders:    Icosapent Ethyl (Vascepa) 1 g CAPS; Take 2 capsules (2 g total) by mouth in the morning    Lipid panel; Future

## 2024-10-21 NOTE — ASSESSMENT & PLAN NOTE
Patient relates that her gastritis symptoms have improved significantly inquired if we can try to stop the omeprazole which I agreed that we can try that and see whether or not she has any relapse of symptoms she knows that if gastritis or acid reflux symptoms reappear to resume the omeprazole at 20 mg prior to breakfast.

## 2024-10-21 NOTE — ASSESSMENT & PLAN NOTE
Lipid profile reviewed with the patient shows significant improvements recommend continuation of Zetia 10 mg daily and Vascepa 2 g daily along with low-cholesterol diet    Orders:    ezetimibe (ZETIA) 10 mg tablet; Take 1 tablet (10 mg total) by mouth daily    fenofibrate (TRICOR) 145 mg tablet; Take 1 tablet (145 mg total) by mouth daily    Lipid panel; Future

## 2024-10-21 NOTE — ASSESSMENT & PLAN NOTE
Status post repair of right supraspinatus tendon by surgery patient is doing well on her recovery course now starting physical therapy under the direction of her orthopedic surgeon

## 2024-10-23 ENCOUNTER — OFFICE VISIT (OUTPATIENT)
Dept: PHYSICAL THERAPY | Facility: OTHER | Age: 54
End: 2024-10-23
Payer: COMMERCIAL

## 2024-10-23 DIAGNOSIS — M75.101 TEAR OF RIGHT SUPRASPINATUS TENDON: Primary | ICD-10-CM

## 2024-10-23 PROCEDURE — 97110 THERAPEUTIC EXERCISES: CPT | Performed by: PHYSICAL THERAPIST

## 2024-10-23 PROCEDURE — 97140 MANUAL THERAPY 1/> REGIONS: CPT | Performed by: PHYSICAL THERAPIST

## 2024-10-23 NOTE — PROGRESS NOTES
"Daily Note     Today's date: 10/23/2024  Patient name: Lorena Phillips  : 1970  MRN: [id number]  Referring provider: Vance Knox*  Dx:   Encounter Diagnosis     ICD-10-CM    1. Tear of right supraspinatus tendon  M75.101       Patient was 1 on 1 for 25 mins            Start Time: 1500  Stop Time: 1530  Total time in clinic (min): 30 minutes    Subjective: Patient reports shoulder continues to improve.       Objective: See treatment diary below.  Reviewed HEP.        Assessment: Tolerated treatment well. Patient PROM is is within the limits of the post operative protocol.  Patient reports improvements in resting symptoms and with AAROM. Patient will discharge the sling this week as she is now 6 weeks post op.  Patient will begin AROM with therapist .       Plan: Continue plan of care per protocol.       Precautions: RCR  standard protocol,  PROM only 6 weeks. 90 degrees of flexion, 60 degrees of abduction, 30 degrees of ER at 0, IR to the body       Manuals 9/16 9/19/24 9/23 9/30 10/9 10/16 10/23                   PROM  JRS in shoulder flexion, abduction, and ER MJS  PROM  MJS PROM MJS PROM Mjs PROM MJS PROM                                Neuro Re-Ed  24                                                                                                      Ther Ex  24                                     Iso scap retraction 10 x 5\" 10 x 5\"  2 x 10 x 5\" 3 x 10 x 5\"  3 x 10      Elbow/ wrist AROM 10 x 5\" AAROM X 30 each direction AROM x 30  AROM x 30 AROM x 30        AAROM       10 flex, ER  AAROM 10 x 10 Flex, ER                                              Ther Activity  24                                     Gait Training                                       Modalities                                            "

## 2024-10-28 ENCOUNTER — OFFICE VISIT (OUTPATIENT)
Dept: PHYSICAL THERAPY | Facility: OTHER | Age: 54
End: 2024-10-28
Payer: COMMERCIAL

## 2024-10-28 DIAGNOSIS — M75.101 TEAR OF RIGHT SUPRASPINATUS TENDON: Primary | ICD-10-CM

## 2024-10-28 PROCEDURE — 97110 THERAPEUTIC EXERCISES: CPT | Performed by: PHYSICAL THERAPIST

## 2024-10-28 PROCEDURE — 97140 MANUAL THERAPY 1/> REGIONS: CPT | Performed by: PHYSICAL THERAPIST

## 2024-10-28 NOTE — PROGRESS NOTES
"Daily Note     Today's date: 10/28/2024  Patient name: Lorena Phillips  : 1970  MRN: [id number]  Referring provider: Vance Knox*  Dx:   Encounter Diagnosis     ICD-10-CM    1. Tear of right supraspinatus tendon  M75.101         Patient was 1 on 1 for 30 mins            Start Time: 1400  Stop Time: 1430  Total time in clinic (min): 30 minutes    Subjective: Patient reports shoulder continues to improve.       Objective: See treatment diary below.  Reviewed HEP.   Flexion AROM 175 Abd AROM 165, AROM 45 deg       Assessment: Tolerated treatment well.   Patient protocol now allows for AROM, patient nearly full pain free AROM.  Paients with some mild stiffness into ER at 0 degrees but this will likely resolve with some light stretching.       Plan: Continue plan of care per protocol.       Precautions: RCR  standard protocol,  PROM only 6 weeks. 90 degrees of flexion, 60 degrees of abduction, 30 degrees of ER at 0, IR to the body       Manuals 9/16 9/19/24 9/23 9/30 10/9 10/16 10/23 10/28                  PROM  JRS in shoulder flexion, abduction, and ER MJS  PROM  MJS PROM MJS PROM Mjs PROM MJS PROM Mjs prom                                Neuro Re-Ed  24                                                                                                      Ther Ex  24                                     Iso scap retraction 10 x 5\" 10 x 5\"  2 x 10 x 5\" 3 x 10 x 5\"  3 x 10 3 x 10      Elbow/ wrist AROM 10 x 5\" AAROM X 30 each direction AROM x 30  AROM x 30 AROM x 30   AROM x 30      AAROM       10 flex, ER  AAROM 10 x 10 Flex, ER  AAROm 10 x 10 Flex, ER      AROM         Flexion, abd, ER 3 x 10      Iso ER         X 20                   Ther Activity  24                                     Gait Training                                       Modalities                                            "

## 2024-11-04 ENCOUNTER — OFFICE VISIT (OUTPATIENT)
Dept: PHYSICAL THERAPY | Facility: OTHER | Age: 54
End: 2024-11-04
Payer: COMMERCIAL

## 2024-11-04 DIAGNOSIS — M75.101 TEAR OF RIGHT SUPRASPINATUS TENDON: Primary | ICD-10-CM

## 2024-11-04 PROCEDURE — 97110 THERAPEUTIC EXERCISES: CPT | Performed by: PHYSICAL THERAPIST

## 2024-11-04 PROCEDURE — 97140 MANUAL THERAPY 1/> REGIONS: CPT | Performed by: PHYSICAL THERAPIST

## 2024-11-04 NOTE — PROGRESS NOTES
"Daily Note     Today's date: 2024  Patient name: Lorena Phillips  : 1970  MRN: [id number]  Referring provider: Vance Knox*  Dx:   Encounter Diagnosis     ICD-10-CM    1. Tear of right supraspinatus tendon  M75.101         Patient was 1 on 1 for 30 mins            Start Time: 1420  Stop Time: 1445  Total time in clinic (min): 25 minutes    Subjective: Patient reports shoulder continues to improve.       Objective: See treatment diary below.  Reviewed HEP.   Flexion AROM 175 Abd AROM 170, AROM 60 deg       Assessment: Tolerated treatment well. Patients ROM is progressing without incident and as expected per post operative protocol.       Plan: Continue plan of care per protocol.       Precautions: RCR  standard protocol,  PROM only 6 weeks. 90 degrees of flexion, 60 degrees of abduction, 30 degrees of ER at 0, IR to the body       Manuals 9/16 9/19/24 9/23 9/30 10/9 10/16 10/23 10/28 11/4                 PROM  JRS in shoulder flexion, abduction, and ER MJS  PROM  MJS PROM MJS PROM Mjs PROM MJS PROM Mjs prom  MJS PROM                              Neuro Re-Ed  24                                                                                                      Ther Ex  24                                     Iso scap retraction 10 x 5\" 10 x 5\"  2 x 10 x 5\" 3 x 10 x 5\"  3 x 10 3 x 10  3 x10    Elbow/ wrist AROM 10 x 5\" AAROM X 30 each direction AROM x 30  AROM x 30 AROM x 30   AROM x 30      AAROM       10 flex, ER  AAROM 10 x 10 Flex, ER  AAROm 10 x 10 Flex, ER      AROM         Flexion, abd, ER 3 x 10  Flexion, abd    Iso ER/IR        X 20  X 20 YTB                 Ther Activity  24                                     Gait Training                                       Modalities                                            "

## 2024-11-08 DIAGNOSIS — K29.70 GASTRITIS WITHOUT BLEEDING, UNSPECIFIED CHRONICITY, UNSPECIFIED GASTRITIS TYPE: ICD-10-CM

## 2024-11-11 ENCOUNTER — OFFICE VISIT (OUTPATIENT)
Dept: PHYSICAL THERAPY | Facility: OTHER | Age: 54
End: 2024-11-11
Payer: COMMERCIAL

## 2024-11-11 DIAGNOSIS — M75.101 TEAR OF RIGHT SUPRASPINATUS TENDON: Primary | ICD-10-CM

## 2024-11-11 PROCEDURE — 97140 MANUAL THERAPY 1/> REGIONS: CPT | Performed by: PHYSICAL THERAPIST

## 2024-11-11 PROCEDURE — 97110 THERAPEUTIC EXERCISES: CPT | Performed by: PHYSICAL THERAPIST

## 2024-11-11 NOTE — PROGRESS NOTES
"Daily Note     Today's date: 2024  Patient name: Lorena Phillips  : 1970  MRN: [id number]  Referring provider: Vance Knox*  Dx:   Encounter Diagnosis     ICD-10-CM    1. Tear of right supraspinatus tendon  M75.101           Patient was 1 on 1 for 25 mins            Start Time: 1400  Stop Time: 1425  Total time in clinic (min): 25 minutes    Subjective: Patient reports shoulder continues to improve.       Objective: See treatment diary below.  Reviewed HEP.   Flexion AROM 178 Abd AROM 175, AROM 60 deg       Assessment: Tolerated treatment well. Patients ROM is nearly full and patient has minimal complaints.  Patient is performing well within post operative protocol. Patient will follow up physician.       Plan: Continue plan of care per protocol.       Precautions: RCR  standard protocol,  PROM only 6 weeks. 90 degrees of flexion, 60 degrees of abduction, 30 degrees of ER at 0, IR to the body       Manuals 9/16 9/19/24 9/23 9/30 10/9 10/16 10/23 10/28 11/4 11/11                PROM  JRS in shoulder flexion, abduction, and ER MJS  PROM  MJS PROM MJS PROM Mjs PROM MJS PROM Mjs prom  MJS PROM PROM                             Neuro Re-Ed  24                                                                                                      Ther Ex  24                                     Iso scap retraction 10 x 5\" 10 x 5\"  2 x 10 x 5\" 3 x 10 x 5\"  3 x 10 3 x 10  3 x10 3 x 10   Elbow/ wrist AROM 10 x 5\" AAROM X 30 each direction AROM x 30  AROM x 30 AROM x 30   AROM x 30   AROM x 30   AAROM       10 flex, ER  AAROM 10 x 10 Flex, ER  AAROm 10 x 10 Flex, ER      AROM         Flexion, abd, ER 3 x 10  Flexion, abd Felxion and abd   Iso ER/IR        X 20  X 20 YTB 20 x YTB                 Ther Activity  24                                     Gait Training                                       Modalities                                            "

## 2024-11-18 ENCOUNTER — OFFICE VISIT (OUTPATIENT)
Dept: PHYSICAL THERAPY | Facility: OTHER | Age: 54
End: 2024-11-18
Payer: COMMERCIAL

## 2024-11-18 ENCOUNTER — OFFICE VISIT (OUTPATIENT)
Dept: OBGYN CLINIC | Facility: OTHER | Age: 54
End: 2024-11-18

## 2024-11-18 VITALS
HEART RATE: 58 BPM | BODY MASS INDEX: 32.57 KG/M2 | HEIGHT: 62 IN | WEIGHT: 177 LBS | DIASTOLIC BLOOD PRESSURE: 73 MMHG | SYSTOLIC BLOOD PRESSURE: 120 MMHG

## 2024-11-18 DIAGNOSIS — Z98.890 S/P RIGHT ROTATOR CUFF REPAIR: Primary | ICD-10-CM

## 2024-11-18 DIAGNOSIS — M75.101 TEAR OF RIGHT SUPRASPINATUS TENDON: Primary | ICD-10-CM

## 2024-11-18 PROCEDURE — 97110 THERAPEUTIC EXERCISES: CPT | Performed by: PHYSICAL THERAPIST

## 2024-11-18 PROCEDURE — 97140 MANUAL THERAPY 1/> REGIONS: CPT | Performed by: PHYSICAL THERAPIST

## 2024-11-18 PROCEDURE — 99024 POSTOP FOLLOW-UP VISIT: CPT | Performed by: PHYSICIAN ASSISTANT

## 2024-11-18 NOTE — PROGRESS NOTES
"Daily Note     Today's date: 2024  Patient name: Lorena Phillips  : 1970  MRN: [id number]  Referring provider: Vance Knox*  Dx:   Encounter Diagnosis     ICD-10-CM    1. Tear of right supraspinatus tendon  M75.101             Patient was 1 on 1 for 25 mins            Start Time: 1445  Stop Time: 1515  Total time in clinic (min): 30 minutes    Subjective: Patient reports shoulder continues to improve.       Objective: See treatment diary below.  Reviewed HEP.   Flexion AROM 178 Abd AROM 175, AROM 60 deg       Assessment: Tolerated treatment well. Patients ROM is nearly full and patient has minimal complaints.  Patient is performing well within post operative protocol. Patient will follow up physician.       Plan: Continue plan of care per protocol.       Precautions: RCR  standard protocol,  PROM only 6 weeks. 90 degrees of flexion, 60 degrees of abduction, 30 degrees of ER at 0, IR to the body       Manuals 9/16 9/19/24 9/23 9/30 10/9 10/16 10/23 10/28 11/4 11/11 11/18                 PROM  JRS in shoulder flexion, abduction, and ER MJS  PROM  MJS PROM MJS PROM Mjs PROM MJS PROM Mjs prom  MJS PROM PROM PROM                               Neuro Re-Ed  24                                                                                                              Ther Ex  24                                        Iso scap retraction 10 x 5\" 10 x 5\"  2 x 10 x 5\" 3 x 10 x 5\"  3 x 10 3 x 10  3 x10 3 x 10    Elbow/ wrist AROM 10 x 5\" AAROM X 30 each direction AROM x 30  AROM x 30 AROM x 30   AROM x 30   AROM x 30    AAROM       10 flex, ER  AAROM 10 x 10 Flex, ER  AAROm 10 x 10 Flex, ER       AROM         Flexion, abd, ER 3 x 10  Flexion, abd Felxion and abd Flexion and abd    Iso ER/IR        X 20  X 20 YTB 20 x YTB  20 x YTB                 Ther Activity  24                                        Gait Training                                          Modalities                 "

## 2024-11-18 NOTE — PROGRESS NOTES
"Surgery: SARS w/RCR, BT and SAD on 9/13/24    S: Patient is doing well.  They have been compliant with formal PT and the HEP.  Denies new injury or trauma.  Gissel is pleased with her progress    /73   Pulse 58   Ht 5' 1.5\" (1.562 m)   Wt 80.3 kg (177 lb)   BMI 32.90 kg/m²     O: Right shoulder  FF: 170  Abd: >90   ER: equivalent  IR: LLS  ER strength: Good Resistance   Good elbow, wrist and hand range of motion  Skin - warm and dry  SI  NVI    A/P: 9 weeks following arthroscopic rotator cuff repair, bicep tenodesis and subacromial decompression - right shoulder  Continue with formal physical therapy - following standard RCR protocol  HEP - per therapy.    Pain control - Tylenol 1000 mg every 8 hours  Ice as needed - 20 minutes on and 20 minutes off  Follow up in 6-8 weeks   "

## 2024-12-02 ENCOUNTER — APPOINTMENT (OUTPATIENT)
Dept: PHYSICAL THERAPY | Facility: OTHER | Age: 54
End: 2024-12-02
Payer: COMMERCIAL

## 2024-12-04 ENCOUNTER — OFFICE VISIT (OUTPATIENT)
Dept: PHYSICAL THERAPY | Facility: OTHER | Age: 54
End: 2024-12-04
Payer: COMMERCIAL

## 2024-12-04 DIAGNOSIS — M75.101 TEAR OF RIGHT SUPRASPINATUS TENDON: Primary | ICD-10-CM

## 2024-12-04 PROCEDURE — 97110 THERAPEUTIC EXERCISES: CPT | Performed by: PHYSICAL THERAPIST

## 2024-12-04 NOTE — PROGRESS NOTES
Daily Note     Today's date: 2024  Patient name: Lorena Phillips  : 1970  MRN: [id number]  Referring provider: Vance Knox*  Dx:   Encounter Diagnosis     ICD-10-CM    1. Tear of right supraspinatus tendon  M75.101               Patient was 1 on 1 for 45 mins            Start Time: 1605  Stop Time: 1645  Total time in clinic (min): 40 minutes    Subjective: Patient reports shoulder continues to improve.        Objective: See treatment diary below.  Reviewed HEP.   Flexion AROM 178 Abd AROM 175, AROM 60 deg       Assessment: Tolerated treatment well. Patient is now 12 weeks post op, per RCR protocol, light strengthening was initiated today. Patient with no complaints of shoulder pain and only minimal fatigue.       Plan: Continue plan of care per protocol.       Precautions: RCR  standard protocol,  PROM only 6 weeks. 90 degrees of flexion, 60 degrees of abduction, 30 degrees of ER at 0, IR to the body       Manuals 9/16 9/19/24 9/23 9/30 10/9 10/16 10/23 10/28 11/4 11/11 11/18 12/4                  PROM  JRS in shoulder flexion, abduction, and ER MJS  PROM  MJS PROM MJS PROM Mjs PROM MJS PROM Mjs prom  MJS PROM PROM PROM                                  Neuro Re-Ed  24                                                                                                                      Ther Ex  24             Raises Scaption            3 x 12 1#   IR/ER             OTB 3 x 12                  AROM         Flexion, abd, ER 3 x 10  Flexion, abd Felxion and abd Flexion and abd     Scapular retraction             3 x 10 BTB   Prone Rows            3 x 10 3#                  Ther Activity  24                                           Gait Training                                             Modalities

## 2024-12-05 ENCOUNTER — OFFICE VISIT (OUTPATIENT)
Dept: ENDOCRINOLOGY | Facility: CLINIC | Age: 54
End: 2024-12-05
Payer: COMMERCIAL

## 2024-12-05 VITALS
BODY MASS INDEX: 31.98 KG/M2 | DIASTOLIC BLOOD PRESSURE: 78 MMHG | WEIGHT: 173.8 LBS | HEIGHT: 62 IN | SYSTOLIC BLOOD PRESSURE: 120 MMHG

## 2024-12-05 DIAGNOSIS — E11.40 TYPE 2 DIABETES MELLITUS WITH DIABETIC NEUROPATHY, WITHOUT LONG-TERM CURRENT USE OF INSULIN (HCC): Primary | ICD-10-CM

## 2024-12-05 PROCEDURE — 99214 OFFICE O/P EST MOD 30 MIN: CPT | Performed by: INTERNAL MEDICINE

## 2024-12-05 RX ORDER — TIRZEPATIDE 7.5 MG/.5ML
INJECTION, SOLUTION SUBCUTANEOUS
Qty: 2 ML | Refills: 0 | Status: SHIPPED | OUTPATIENT
Start: 2024-12-05

## 2024-12-05 NOTE — PROGRESS NOTES
12/5/2024    Assessment & Plan      Diagnoses and all orders for this visit:    Type 2 diabetes mellitus with diabetic neuropathy, without long-term current use of insulin (HCC)  -     Tirzepatide (Mounjaro) 7.5 MG/0.5ML SOAJ; 7.5 mg weekly  -     Hemoglobin A1C; Future  -     Comprehensive metabolic panel; Future  -     Lipid Panel with Direct LDL reflex; Future  -     CBC and differential; Future  -     Albumin / creatinine urine ratio; Future  -     TSH, 3rd generation; Future        Assessment/Plan:  1.  Type 2 diabetes: Overall well-controlled.  We discussed the pros and cons of increasing tirzepatide to 7.5 mg weekly.  We elected to increase her dose to 7.5 mg weekly and she will keep me posted with how this is going and any side effects.  Arrange for follow-up in about 6 months and labs are ordered to be done prior to next appointment.  She can have these done at the same time as her primary care physicians requested labs as well.      CC: Diabetes follow-up    History of Present Illness     HPI: Lorena Phillips is a 54 y.o. year old female with type 2 diabetes for  about 12  years.  She is on oral agents at home and takes tirzepatide 5 mg weekly. She denies any polyuria, polydipsia, nocturia and blurry vision.  She denies neuropathy, nephropathy, and retinopathy.  Previously was on Januvia though this was discontinued due to improved A1c.  She tolerated it well.  No history of pancreatitis.  She is up-to-date on eye and foot exams.    Hypoglycemic episodes: No.    Eye exam: UTD.    Foot exam: UTD. (Oct 2024)    Blood Sugar/Glucometer/Pump/CGM review: She checks her sugars about once daily fasting typically ranging .  No hypoglycemia.    Review of Systems   Constitutional:  Negative for fatigue.   HENT:  Negative for trouble swallowing and voice change.    Eyes:  Negative for visual disturbance.   Respiratory:  Negative for shortness of breath.    Cardiovascular:  Negative for palpitations and leg  swelling.   Gastrointestinal:  Negative for abdominal pain, nausea and vomiting.   Endocrine: Negative for polydipsia and polyuria.   Musculoskeletal:  Negative for arthralgias and myalgias.   Skin:  Negative for rash.   Neurological:  Negative for dizziness, tremors and weakness.   Hematological:  Negative for adenopathy.   Psychiatric/Behavioral:  Negative for agitation and confusion.        Historical Information   Past Medical History:   Diagnosis Date    Allergic     seasonal    Anxiety     CPAP (continuous positive airway pressure) dependence     Diabetes mellitus (HCC)     GERD (gastroesophageal reflux disease)     Hypertension 2015    Sleep apnea      Past Surgical History:   Procedure Laterality Date    BELPHAROPTOSIS REPAIR Bilateral     2022    CARPAL TUNNEL RELEASE Bilateral     COLONOSCOPY  2018    10 years per pt    EGD      ENDOMETRIAL ABLATION W/ NOVASURE      HYSTERECTOMY  2006    still has 1 ovary-?side    NASAL SEPTUM SURGERY      KS SURGICAL ARTHROSCOPY SHOULDER W/ROTATOR CUFF RPR Right 2024    Procedure: SHOULDER ARTHROSCOPIC ROTATOR CUFF REPAIR, SUBACROMIAL DECOMPRESSION, BICEP TENODESIS;  Surgeon: Vance Knox MD;  Location: [facility] MAIN OR;  Service: Orthopedics     Social History   Social History     Substance and Sexual Activity   Alcohol Use Not Currently     Social History     Substance and Sexual Activity   Drug Use Never     Social History     Tobacco Use   Smoking Status Never    Passive exposure: Never   Smokeless Tobacco Never     Family History:   Family History   Problem Relation Age of Onset    Anxiety disorder Mother     Stroke Mother     Lymphoma Father     Hypertension Father             Diabetes Father             Cancer Father     No Known Problems Sister     Stomach cancer Maternal Grandmother 48    Cancer Maternal Grandmother     Lung cancer Maternal Grandfather 80    Cancer Maternal Grandfather     No Known Problems  "Paternal Grandmother     Heart disease Paternal Grandfather     No Known Problems Maternal Aunt     No Known Problems Maternal Aunt     No Known Problems Paternal Aunt     No Known Problems Paternal Aunt     Leukemia Paternal Uncle 39    Lung cancer Paternal Uncle 75    Coronary artery disease Paternal Aunt     Thyroid disease Paternal Aunt     Diabetes Brother         Type 2       Meds/Allergies   Current Outpatient Medications   Medication Sig Dispense Refill    bromocriptine (PARLODEL) 2.5 mg tablet Take 1 tablet (2.5 mg total) by mouth daily 90 tablet 1    cetirizine (ZyrTEC) 10 MG chewable tablet Chew 10 mg daily      estradiol (ESTRACE) 1 mg tablet Take 1 tablet (1 mg total) by mouth daily 90 tablet 4    ezetimibe (ZETIA) 10 mg tablet Take 1 tablet (10 mg total) by mouth daily 90 tablet 1    fenofibrate (TRICOR) 145 mg tablet Take 1 tablet (145 mg total) by mouth daily 90 tablet 1    Icosapent Ethyl (Vascepa) 1 g CAPS Take 2 capsules (2 g total) by mouth in the morning 180 capsule 2    multivitamin (THERAGRAN) TABS Take 1 tablet by mouth daily      omeprazole (PriLOSEC) 20 mg delayed release capsule Take 1 capsule (20 mg total) by mouth daily 90 capsule 1    Tirzepatide (Mounjaro) 7.5 MG/0.5ML SOAJ 7.5 mg weekly 2 mL 0    Vitamin D, Cholecalciferol, 50 MCG (2000 UT) CAPS Take by mouth      gabapentin (NEURONTIN) 100 mg capsule Take 2 capsules (200 mg total) by mouth 2 (two) times a day (Patient taking differently: Take 200 mg by mouth 2 (two) times a day 200mg dinner 300mg at bedtime) 360 capsule 1    valACYclovir (VALTREX) 1,000 mg tablet Take 1 tablet (1,000 mg total) by mouth 2 (two) times a day for 5 days (Patient taking differently: Take 1,000 mg by mouth if needed) 10 tablet 0     No current facility-administered medications for this visit.     Allergies   Allergen Reactions    Statins Irritability, Lightheadedness and Other (See Comments)       Objective   Vitals: Blood pressure 120/78, height 5' 1.5\" " "(1.562 m), weight 78.8 kg (173 lb 12.8 oz).  Invasive Devices       None                   Physical Exam  Vitals reviewed.   Constitutional:       General: She is not in acute distress.     Appearance: She is well-developed. She is not diaphoretic.   HENT:      Head: Normocephalic and atraumatic.   Eyes:      Conjunctiva/sclera: Conjunctivae normal.      Pupils: Pupils are equal, round, and reactive to light.   Neck:      Thyroid: No thyromegaly.   Cardiovascular:      Rate and Rhythm: Normal rate and regular rhythm.   Pulmonary:      Effort: Pulmonary effort is normal. No respiratory distress.      Breath sounds: Normal breath sounds.   Abdominal:      General: Bowel sounds are normal.      Palpations: Abdomen is soft.   Musculoskeletal:         General: Normal range of motion.      Cervical back: Normal range of motion and neck supple.   Skin:     General: Skin is warm and dry.      Findings: No rash.   Neurological:      Mental Status: She is alert and oriented to person, place, and time.      Motor: No abnormal muscle tone.   Psychiatric:         Behavior: Behavior normal.         The history was obtained from the review of the chart and from the patient.    Lab Results:    Most recent Alc is  Lab Results   Component Value Date    HGBA1C 5.7 (H) 10/20/2024           No components found for: \"HA1C\"  No components found for: \"GLU\"    Lab Results   Component Value Date    CREATININE 0.73 10/20/2024    CREATININE 0.79 06/25/2024    CREATININE 0.79 02/09/2024    BUN 17 10/20/2024    K 3.9 10/20/2024     10/20/2024    CO2 30 10/20/2024     GFR, Calculated   Date Value Ref Range Status   05/08/2020 93 >60 mL/min/1.73m2 Final     Comment:     mL/min per 1.73 square meters                                            Normal Function or Mild Renal    Disease (if clinically at risk):  >or=60  Moderately Decreased:                30-59  Severely Decreased:                  15-29  Renal Failure:                         " "<15                                            -American GFR: multiply reported GFR by 1.16    Please note that the eGFR is based on the CKD-EPI calculation, and is not intended to be used for drug dosing.                                            Note: Calculated GFR may not be an accurate indicator of renal function if the patient's renal function is not in a steady state.     eGFRcr   Date Value Ref Range Status   12/08/2022 85 >59 Final     eGFR   Date Value Ref Range Status   10/20/2024 92 ml/min/1.73sq m Final     No components found for: \"MALBCRER\"    Lab Results   Component Value Date    CHOL 165 09/23/2015    HDL 44 (L) 10/20/2024    TRIG 107 10/20/2024       Lab Results   Component Value Date    ALT 12 10/20/2024    AST 20 10/20/2024    ALKPHOS 36 10/20/2024       Lab Results   Component Value Date    TSH 1.89 10/28/2022           Future Appointments   Date Time Provider Department Center   12/11/2024 12:30 PM Reji Erickson, PT [facility]   12/16/2024  2:15 PM Reji Erickson PT [facility]   1/20/2025  2:30 PM Carmen Salazar PA-C [facility]TH[facility]   2/28/2025  9:00 AM Yo Tanner MD INT [facility]   4/11/2025 10:20 AM Yo Tanner MD INT [facility]   7/25/2025  9:20 AM BE MAMMO SLN 1 BE [facility] [place]   9/2/2025  9:00 AM Leticia Ray MD [facility]   10/15/2025  8:30 AM Vance Collier DPM [facility]       Portions of the record may have been created with voice recognition software. Occasional wrong word or \"sound a like\" substitutions may have occurred due to the inherent limitations of voice recognition software. Read the chart carefully and recognize, using context, where substitutions have occurred.    "

## 2024-12-11 ENCOUNTER — TELEPHONE (OUTPATIENT)
Dept: ENDOCRINOLOGY | Facility: CLINIC | Age: 54
End: 2024-12-11

## 2024-12-11 ENCOUNTER — OFFICE VISIT (OUTPATIENT)
Dept: PHYSICAL THERAPY | Facility: OTHER | Age: 54
End: 2024-12-11
Payer: COMMERCIAL

## 2024-12-11 DIAGNOSIS — M75.101 TEAR OF RIGHT SUPRASPINATUS TENDON: Primary | ICD-10-CM

## 2024-12-11 PROCEDURE — 97140 MANUAL THERAPY 1/> REGIONS: CPT | Performed by: PHYSICAL THERAPIST

## 2024-12-11 PROCEDURE — 97110 THERAPEUTIC EXERCISES: CPT | Performed by: PHYSICAL THERAPIST

## 2024-12-11 NOTE — PROGRESS NOTES
Daily Note     Today's date: 2024  Patient name: Lorena Phillips  : 1970  MRN: [id number]  Referring provider: Vance Knox*  Dx:   Encounter Diagnosis     ICD-10-CM    1. Tear of right supraspinatus tendon  M75.101               Patient was 1 on 1 for 45 mins            Start Time: 1230  Stop Time: 1310  Total time in clinic (min): 40 minutes    Subjective: Patient reports improvements in shoulder function.        Objective: See treatment diary below.  Reviewed HEP.   Flexion AROM 178 Abd AROM 175, AROM 60 deg       Assessment: Tolerated treatment well. Patient is now 13 weeks post op and reports no pain and soreness.  She continues to tolerated Lower extremity strengthening without complaint.           Plan: Continue plan of care per protocol.       Precautions: RCR  standard protocol,  PROM only 6 weeks. 90 degrees of flexion, 60 degrees of abduction, 30 degrees of ER at 0, IR to the body       Manuals 9/19/24 9/23 9/30 10/9 10/16 10/23 10/28 11/4 11/11 11/18 12/4 12/11                  PROM JRS in shoulder flexion, abduction, and ER MJS  PROM  MJS PROM MJS PROM Mjs PROM MJS PROM Mjs prom  MJS PROM PROM PROM     ER and IR PROM            Ms                  Neuro Re-Ed 24                                                                                                                       Ther Ex 24              Raises Scaption           3 x 12 1# 3 x 12 2#   IR/ER            OTB 3 x 12 BTB 3 x 12                   AROM        Flexion, abd, ER 3 x 10  Flexion, abd Felxion and abd Flexion and abd      Scapular retraction            3 x 10 BTB 3 x 12  MTB   Prone Rows           3 x 10 3# 3 x 12 3#                  Ther Activity 24                                            Gait Training                                             Modalities

## 2024-12-13 NOTE — TELEPHONE ENCOUNTER
PA for Mounjaro 7.5mg SUBMITTED to [employer]    via    [x]CMM-KEY: [id number]   []Surescripts-Case ID #   []Availity-Auth ID # NDC #   []Faxed to plan   []Other website   []Phone call Case ID #     [x]PA sent as URGENT    All office notes, labs and other pertaining documents and studies sent. Clinical questions answered. Awaiting determination from insurance company.     Turnaround time for your insurance to make a decision on your Prior Authorization can take 7-21 business days.

## 2024-12-13 NOTE — TELEPHONE ENCOUNTER
PA for Mounjaro 7.5mg DENIED    Reason:        Message sent to office clinical pool Yes    Denial letter scanned into [employer] Yes    Appeal started No (Provider will need to decide if appeal is warranted and send clinical documentation to Prior Authorization Team for initiation.)    **Please follow up with your patient regarding denial and next steps**

## 2024-12-16 ENCOUNTER — OFFICE VISIT (OUTPATIENT)
Dept: PHYSICAL THERAPY | Facility: OTHER | Age: 54
End: 2024-12-16
Payer: COMMERCIAL

## 2024-12-16 DIAGNOSIS — M75.101 TEAR OF RIGHT SUPRASPINATUS TENDON: Primary | ICD-10-CM

## 2024-12-16 PROCEDURE — 97110 THERAPEUTIC EXERCISES: CPT | Performed by: PHYSICAL THERAPIST

## 2024-12-16 NOTE — PROGRESS NOTES
Daily Note     Today's date: 2024  Patient name: Lorena Phillips  : 1970  MRN: [id number]  Referring provider: Vance Knox*  Dx:   Encounter Diagnosis     ICD-10-CM    1. Tear of right supraspinatus tendon  M75.101               Patient was 1 on 1 for 40 mins            Start Time: 1415  Stop Time: 1455  Total time in clinic (min): 40 minutes    Subjective: Patient reports improvements in shoulder function.        Objective: See treatment diary below.  Reviewed HEP.   Flexion AROM 178 Abd AROM 175, AROM 60 deg       Assessment: Tolerated treatment well. Patient is now 14 weeks post op and reports no pain and soreness.  Patient is tolerated strengthening without issue, patient has little pain and is getting stronger based on tolerance to UE strengthening.  Patient with some challenge lifting 5 lbs overhead, but was able to complete reps of 4 lbs without substantial difficulty.           Plan: Continue plan of care per protocol.       Precautions: RCR  standard protocol,  PROM only 6 weeks. 90 degrees of flexion, 60 degrees of abduction, 30 degrees of ER at 0, IR to the body       Manuals 9/19/24 9/23 9/30 10/9 10/16 10/23 10/28 11/4 11/11 11/18 12/4 12/11 12/16                   PROM JRS in shoulder flexion, abduction, and ER MJS  PROM  MJS PROM MJS PROM Mjs PROM MJS PROM Mjs prom  MJS PROM PROM PROM      ER and IR PROM            Ms                    Neuro Re-Ed 24                                                                                                                               Ther Ex 24               Raises Scaption           3 x 12 1# 3 x 12 2# 3x 12 2#   IR/ER            OTB 3 x 12 BTB 3 x 12  GTB 3 x 12   AAROM            IR 10- x 10 IR 10 x 10 cane    AROM        Flexion, abd, ER 3 x 10  Flexion, abd Felxion and abd Flexion and abd       Scapular retraction            3 x 10 BTB 3 x 12  MTB MTB 3 x 12   Prone Rows           3 x 10 3# 3 x 12 3# 3 x 12 4#   OH  press             3 x 8 4#   Ther Activity 9/19/24                                               Gait Training                                                Modalities                                                      PRINCIPAL PROCEDURE  Procedure: Total knee replacement  Findings and Treatment:

## 2024-12-17 NOTE — TELEPHONE ENCOUNTER
Patient has an A1c of 7.6 in 2022.  Can we fax this result back so it can be approved based on the criteria provided?  Thanks.

## 2024-12-21 DIAGNOSIS — B00.9 HERPES SIMPLEX: ICD-10-CM

## 2024-12-21 DIAGNOSIS — G25.81 RESTLESS LEG SYNDROME: ICD-10-CM

## 2024-12-21 DIAGNOSIS — E78.5 HYPERLIPIDEMIA, UNSPECIFIED HYPERLIPIDEMIA TYPE: ICD-10-CM

## 2024-12-23 ENCOUNTER — OFFICE VISIT (OUTPATIENT)
Dept: PHYSICAL THERAPY | Facility: OTHER | Age: 54
End: 2024-12-23
Payer: COMMERCIAL

## 2024-12-23 DIAGNOSIS — M75.101 TEAR OF RIGHT SUPRASPINATUS TENDON: Primary | ICD-10-CM

## 2024-12-23 PROCEDURE — 97110 THERAPEUTIC EXERCISES: CPT | Performed by: PHYSICAL THERAPIST

## 2024-12-23 RX ORDER — FENOFIBRATE 145 MG/1
145 TABLET, COATED ORAL DAILY
Qty: 90 TABLET | Refills: 1 | Status: SHIPPED | OUTPATIENT
Start: 2024-12-23 | End: 2025-12-23

## 2024-12-23 RX ORDER — BROMOCRIPTINE MESYLATE 2.5 MG/1
2.5 TABLET ORAL DAILY
Qty: 90 TABLET | Refills: 1 | Status: SHIPPED | OUTPATIENT
Start: 2024-12-23

## 2024-12-23 RX ORDER — VALACYCLOVIR HYDROCHLORIDE 1 G/1
1000 TABLET, FILM COATED ORAL 2 TIMES DAILY
Qty: 10 TABLET | Refills: 0 | Status: SHIPPED | OUTPATIENT
Start: 2024-12-23 | End: 2024-12-28

## 2024-12-23 NOTE — PROGRESS NOTES
Daily Note     Today's date: 2024  Patient name: Lorena Phillips  : 1970  MRN: [id number]  Referring provider: Vance Knox*  Dx:   Encounter Diagnosis     ICD-10-CM    1. Tear of right supraspinatus tendon  M75.101               Patient was 1 on 1 for 40 mins            Start Time: 1330  Stop Time: 1415  Total time in clinic (min): 45 minutes    Subjective: Patient reports improvements in shoulder function.        Objective: See treatment diary below.  Reviewed HEP.   Flexion AROM 178 Abd AROM 178, IR AROM 60 deg       Assessment: Tolerated treatment well. Patient is now 15 weeks post op with full AROM OH and with mild restriction in behind the back functional ROM.  Plan to progress to phase 4 of rehab next visit.  Patient reports onset of left shoulder pain recently, which she thinks comes from overuse.  Exam reveals signs of left Subacromial pain syndrome which her painfree AROM improved with some light RC strengthening.           Plan: Continue plan of care per protocol.       Precautions: RCR  standard protocol,  PROM only 6 weeks. 90 degrees of flexion, 60 degrees of abduction, 30 degrees of ER at 0, IR to the body       Manuals 9/30 10/9 10/16 10/23 10/28 11/4 11/11 11/18 12/4 12/11 12/16 12/23                  PROM MJS PROM MJS PROM Mjs PROM MJS PROM Mjs prom  MJS PROM PROM PROM       ER and IR PROM          Ms                    Neuro Re-Ed                                                                                                                        Ther Ex               Raises Scaption         3 x 12 1# 3 x 12 2# 3x 12 2# 3 x 12 2#  B/l   IR/ER          OTB 3 x 12 BTB 3 x 12  GTB 3 x 12 BTB 3 x 12   Bilateral    AAROM          IR 10- x 10 IR 10 x 10 cane  IR 10 x 10   AROM      Flexion, abd, ER 3 x 10  Flexion, abd Felxion and abd Flexion and abd        Scapular retraction          3 x 10 BTB 3 x 12  MTB MTB 3 x 12 MTB 3 x 12   Prone Rows         3 x 10 3# 3 x 12 3# 3 x  12 4# 3 x 12 4#   Irma row            3 x 8 9.5# 3 x 8  9.5 #   Ther Activity                                             Gait Training                                             Modalities

## 2024-12-27 DIAGNOSIS — E66.01 CLASS 2 SEVERE OBESITY DUE TO EXCESS CALORIES WITH SERIOUS COMORBIDITY AND BODY MASS INDEX (BMI) OF 35.0 TO 35.9 IN ADULT (HCC): Primary | ICD-10-CM

## 2024-12-27 DIAGNOSIS — E66.812 CLASS 2 SEVERE OBESITY DUE TO EXCESS CALORIES WITH SERIOUS COMORBIDITY AND BODY MASS INDEX (BMI) OF 35.0 TO 35.9 IN ADULT (HCC): Primary | ICD-10-CM

## 2024-12-27 RX ORDER — TIRZEPATIDE 7.5 MG/.5ML
INJECTION, SOLUTION SUBCUTANEOUS
Qty: 2 ML | Refills: 0 | Status: SHIPPED | OUTPATIENT
Start: 2024-12-27

## 2025-01-06 ENCOUNTER — OFFICE VISIT (OUTPATIENT)
Dept: PHYSICAL THERAPY | Facility: OTHER | Age: 55
End: 2025-01-06
Payer: COMMERCIAL

## 2025-01-06 DIAGNOSIS — M75.101 TEAR OF RIGHT SUPRASPINATUS TENDON: Primary | ICD-10-CM

## 2025-01-06 PROCEDURE — 97110 THERAPEUTIC EXERCISES: CPT | Performed by: PHYSICAL THERAPIST

## 2025-01-06 NOTE — PROGRESS NOTES
Daily Note     Today's date: 2025  Patient name: Lorena Phillips  : 1970  MRN: [id number]  Referring provider: Vance Knox*  Dx:   Encounter Diagnosis     ICD-10-CM    1. Tear of right supraspinatus tendon  M75.101                 Patient was 1 on 1 for 25 mins            Start Time: 1445  Stop Time: 1520  Total time in clinic (min): 35 minutes    Subjective: Patient reports improvements in shoulder function.        Objective: See treatment diary below.  Reviewed HEP.   Flexion AROM 178 Abd AROM 178, IR AROM 60 deg       Assessment: Tolerated treatment well. Patient is now 16 weeks post op, phase 4 advanced strengthening as begun with some low weight over head and horizontal push. HEP includes OH press now.         Plan: Continue plan of care per protocol.       Precautions: RCR  standard protocol,  PROM only 6 weeks. 90 degrees of flexion, 60 degrees of abduction, 30 degrees of ER at 0, IR to the body       Manuals 9/30 10/9 10/16 10/23 10/28 11/4 11/11 11/18 12/4 12/11 12/16 12/23 1/6                   PROM MJS PROM MJS PROM Mjs PROM MJS PROM Mjs prom  MJS PROM PROM PROM        ER and IR PROM          Ms                      Neuro Re-Ed                                                                                                                                Ther Ex                Raises Scaption         3 x 12 1# 3 x 12 2# 3x 12 2# 3 x 12 2#  B/l 3 x 12 2#   IR/ER          OTB 3 x 12 BTB 3 x 12  GTB 3 x 12 BTB 3 x 12   Bilateral  3 x 12 bilateral    AAROM          IR 10- x 10 IR 10 x 10 cane  IR 10 x 10 IR/ext 10 x 10sec      AROM      Flexion, abd, ER 3 x 10  Flexion, abd Felxion and abd Flexion and abd      Should press 2 x 8    Scapular retraction          3 x 10 BTB 3 x 12  MTB MTB 3 x 12 MTB 3 x 12    Prone Rows         3 x 10 3# 3 x 12 3# 3 x 12 4# 3 x 12 4#    Chest press              3 x 10 4#    Irma row            3 x 8 9.5# 3 x 8  9.5 # 13# 3 x 8    OH press              3 x 8 2#    Ther Activity                                                Gait Training                                                Modalities

## 2025-01-15 ENCOUNTER — OFFICE VISIT (OUTPATIENT)
Dept: PHYSICAL THERAPY | Facility: OTHER | Age: 55
End: 2025-01-15
Payer: COMMERCIAL

## 2025-01-15 DIAGNOSIS — M75.101 TEAR OF RIGHT SUPRASPINATUS TENDON: Primary | ICD-10-CM

## 2025-01-15 PROCEDURE — 97110 THERAPEUTIC EXERCISES: CPT | Performed by: PHYSICAL THERAPIST

## 2025-01-15 PROCEDURE — 97112 NEUROMUSCULAR REEDUCATION: CPT | Performed by: PHYSICAL THERAPIST

## 2025-01-15 NOTE — PROGRESS NOTES
Daily Note     Today's date: 1/15/2025  Patient name: Lorena Phillips  : 1970  MRN: [id number]  Referring provider: Vance Knox*  Dx:   Encounter Diagnosis     ICD-10-CM    1. Tear of right supraspinatus tendon  M75.101                   Patient was 1 on 1 for 45 mins            Start Time: 1445  Stop Time: 1530  Total time in clinic (min): 45 minutes    Subjective: Patient reports improvements in shoulder function.        Objective: See treatment diary below.  Reviewed HEP.   Flexion AROM 178 Abd AROM 178, IR AROM 60 deg       Assessment: Tolerated treatment well. Patient with minimal shoulder complaints, reports mild weakness with heavier overhead lifting.  Patient tolerated progression of UE strengthening with only mild complaint. Patient is progressed as expected and is nearly the end of formal therapy.         Plan: Continue plan of care per protocol.       Precautions: RCR  standard protocol,  PROM only 6 weeks. 90 degrees of flexion, 60 degrees of abduction, 30 degrees of ER at 0, IR to the body       Manuals 10/23 10/28 11/4 11/11 11/18 12/4 12/11 12/16 12/23 1/6 1/15                 PROM MJS PROM Mjs prom  MJS PROM PROM PROM         ER and IR PROM       Ms                     Neuro Re-Ed                                                                                                                Ther Ex              Raises Scaption      3 x 12 1# 3 x 12 2# 3x 12 2# 3 x 12 2#  B/l 3 x 12 2# 3 x 12 3#   IR/ER       OTB 3 x 12 BTB 3 x 12  GTB 3 x 12 BTB 3 x 12   Bilateral  3 x 12 bilateral  3 x 12 bilateral    AAROM       IR 10- x 10 IR 10 x 10 cane  IR 10 x 10 IR/ext 10 x 10sec    IR/ et 10 x 10 sec   AROM   Flexion, abd, ER 3 x 10  Flexion, abd Felxion and abd Flexion and abd      Should press 2 x 8     Scapular retraction       3 x 10 BTB 3 x 12  MTB MTB 3 x 12 MTB 3 x 12     Prone Rows      3 x 10 3# 3 x 12 3# 3 x 12 4# 3 x 12 4#  3 x 8 8#   Chest press           3 x 10 4#      Irma row         3 x 8 9.5# 3 x 8  9.5 # 13# 3 x 8  14# 3 x 8   OH press          3 x 8 2#  3 x 8 5#   Ther Activity              OH Raise and hold           3 laps 50 feet 2,3, 4 #                 Gait Training                                          Modalities

## 2025-01-18 DIAGNOSIS — E66.01 CLASS 2 SEVERE OBESITY DUE TO EXCESS CALORIES WITH SERIOUS COMORBIDITY AND BODY MASS INDEX (BMI) OF 35.0 TO 35.9 IN ADULT (HCC): ICD-10-CM

## 2025-01-18 DIAGNOSIS — E66.812 CLASS 2 SEVERE OBESITY DUE TO EXCESS CALORIES WITH SERIOUS COMORBIDITY AND BODY MASS INDEX (BMI) OF 35.0 TO 35.9 IN ADULT (HCC): ICD-10-CM

## 2025-01-20 ENCOUNTER — OFFICE VISIT (OUTPATIENT)
Dept: OBGYN CLINIC | Facility: OTHER | Age: 55
End: 2025-01-20
Payer: COMMERCIAL

## 2025-01-20 VITALS — BODY MASS INDEX: 30.55 KG/M2 | WEIGHT: 166 LBS | HEIGHT: 62 IN

## 2025-01-20 DIAGNOSIS — Z98.890 S/P RIGHT ROTATOR CUFF REPAIR: Primary | ICD-10-CM

## 2025-01-20 DIAGNOSIS — M75.101 TEAR OF RIGHT SUPRASPINATUS TENDON: ICD-10-CM

## 2025-01-20 PROCEDURE — 99213 OFFICE O/P EST LOW 20 MIN: CPT | Performed by: PHYSICIAN ASSISTANT

## 2025-01-20 RX ORDER — TIRZEPATIDE 7.5 MG/.5ML
INJECTION, SOLUTION SUBCUTANEOUS
Qty: 2 ML | Refills: 0 | Status: SHIPPED | OUTPATIENT
Start: 2025-01-20

## 2025-01-20 NOTE — PROGRESS NOTES
"  Assessment  Diagnoses and all orders for this visit:    S/P right rotator cuff repair    Tear of right supraspinatus tendon      Discussion and Plan:    Gissel is making appropriate progress.  She will continue with formal PT and transition to an independent HEP when appropriate.  She may slowly resume all activities to tolerance.  If she has any issues she will let us know.      Follow up: PRN    Subjective:   Patient ID: Lorena Phillips is a 54 y.o. female    Gissel presents to the office in follow up of her right shoulder rotator cuff repair.  She is a little over 4 months out from surgery.  She has been compliant with formal PT and her HEP.  She denies new injury or trauma.  Denies paresthesias.  She continues to note improvement in ROM and function through therapy.  She is currently working on overhead strength.  She has found she struggles with lifting heavier items into cabinets.  No issues with weight below chest height.  Overall pleased with progress.  Some discomfort at night when laying on either side    The following portions of the patient's history were reviewed and updated as appropriate: allergies, current medications, past family history, past medical history, past social history, past surgical history and problem list.    Objective:  Ht 5' 1.5\" (1.562 m)   Wt 75.3 kg (166 lb)   BMI 30.86 kg/m²       Right Shoulder Exam     Tenderness   The patient is experiencing no tenderness.    Range of Motion   Passive abduction:  normal   External rotation:  normal   Forward flexion:  170   Internal rotation 0 degrees:  Lumbar     Muscle Strength   External rotation: 4/5   Supraspinatus: 5/5     Tests   Sabillon test: negative  Cross arm: negative  Impingement: negative  Drop arm: negative    Other   Erythema: absent  Sensation: normal  Pulse: present    Comments:  Neg speeds  Neg [place]            Physical Exam  Constitutional:       General: She is not in acute distress.     Appearance: She is " well-developed.   HENT:      Head: Normocephalic.   Pulmonary:      Breath sounds: No stridor. No wheezing.   Musculoskeletal:      Cervical back: Normal range of motion.   Skin:     General: Skin is warm and dry.   Neurological:      Mental Status: She is alert and oriented to person, place, and time.   Psychiatric:         Behavior: Behavior normal.         Thought Content: Thought content normal.         Judgment: Judgment normal.

## 2025-01-22 ENCOUNTER — OFFICE VISIT (OUTPATIENT)
Dept: PHYSICAL THERAPY | Facility: OTHER | Age: 55
End: 2025-01-22
Payer: COMMERCIAL

## 2025-01-22 DIAGNOSIS — M75.101 TEAR OF RIGHT SUPRASPINATUS TENDON: Primary | ICD-10-CM

## 2025-01-22 PROCEDURE — 97110 THERAPEUTIC EXERCISES: CPT | Performed by: PHYSICAL THERAPIST

## 2025-01-22 PROCEDURE — 97112 NEUROMUSCULAR REEDUCATION: CPT | Performed by: PHYSICAL THERAPIST

## 2025-01-22 NOTE — PROGRESS NOTES
Daily Note     Today's date: 2025  Patient name: Lorena Phillips  : 1970  MRN: [id number]  Referring provider: Vance Knox*  Dx:   Encounter Diagnosis     ICD-10-CM    1. Tear of right supraspinatus tendon  M75.101                     Patient was 1 on 1 for 40 mins            Start Time: 1500  Stop Time: 1545  Total time in clinic (min): 45 minutes    Subjective: Patient reports improvements in shoulder function.        Objective: See treatment diary below.  Reviewed HEP.   Flexion AROM 178 Abd AROM 178, IR AROM 60 deg       Assessment: Tolerated treatment well. Patient with minimal shoulder complaints, reports mild weakness with heavier overhead lifting.  UE strength progressed to included chest press and table push ups.  Patient reports fatigue in right shoulder and mild pain with overhead lifing on the left shoulder.  Continue to progress UE strengthening for the final stage of the protocol.         Plan: Continue plan of care per protocol.       Precautions: RCR  standard protocol,  PROM only 6 weeks. 90 degrees of flexion, 60 degrees of abduction, 30 degrees of ER at 0, IR to the body       Manuals 11/18 12/4 12/11 12/16 12/23 1/6 1/15 1/22              PROM PROM          ER and IR PROM   Ms                   Neuro Re-Ed                                                                                        Ther Ex           Raises Scaption  3 x 12 1# 3 x 12 2# 3x 12 2# 3 x 12 2#  B/l 3 x 12 2# 3 x 12 3#    IR/ER   OTB 3 x 12 BTB 3 x 12  GTB 3 x 12 BTB 3 x 12   Bilateral  3 x 12 bilateral  3 x 12 bilateral  3 x 15 BTB B/L   AAROM   IR 10- x 10 IR 10 x 10 cane  IR 10 x 10 IR/ext 10 x 10sec    IR/ et 10 x 10 sec IR Ext 10 x 10 sec    AROM  Flexion and abd      Should press 2 x 8      Scapular retraction   3 x 10 BTB 3 x 12  MTB MTB 3 x 12 MTB 3 x 12      Prone Rows  3 x 10 3# 3 x 12 3# 3 x 12 4# 3 x 12 4#  3 x 8 8#    Chest press       3 x 10 4#   3 x 8 8#   Counter push up        3 x  8 1/2   [place] row     3 x 8 9.5# 3 x 8  9.5 # 13# 3 x 8  14# 3 x 8 14# 3 x 8    OH press      3 x 8 2#  3 x 8 5# 3 x 8 5#   Ther Activity           OH Raise and hold       3 laps 50 feet 2,3, 4 #               Gait Training                                 Modalities

## 2025-01-27 ENCOUNTER — OFFICE VISIT (OUTPATIENT)
Dept: PHYSICAL THERAPY | Facility: OTHER | Age: 55
End: 2025-01-27
Payer: COMMERCIAL

## 2025-01-27 DIAGNOSIS — M75.101 TEAR OF RIGHT SUPRASPINATUS TENDON: Primary | ICD-10-CM

## 2025-01-27 PROCEDURE — 97110 THERAPEUTIC EXERCISES: CPT | Performed by: PHYSICAL THERAPIST

## 2025-01-27 NOTE — PROGRESS NOTES
Daily Note     Today's date: 2025  Patient name: Lorena Phillips  : 1970  MRN: [id number]  Referring provider: Vance Knox*  Dx:   Encounter Diagnosis     ICD-10-CM    1. Tear of right supraspinatus tendon  M75.101                     Patient was 1 on 1 for 25 mins            Start Time: 1545  Stop Time: 1630  Total time in clinic (min): 45 minutes    Subjective: Patient reports improvements in shoulder function but notes some soreness after last session.       Objective: See treatment diary below.  Reviewed HEP.   Flexion AROM 178 Abd AROM 178, IR AROM 60 deg       Assessment: Tolerated treatment well. Patient with noted fatigue but no increase in pain after todays session.         Plan: Continue plan of care per protocol.       Precautions: RCR  standard protocol,  PROM only 6 weeks. 90 degrees of flexion, 60 degrees of abduction, 30 degrees of ER at 0, IR to the body       Manuals 11/18 12/4 12/11 12/16 12/23 1/6 1/15 1/22 1/27               PROM PROM           ER and IR PROM   Ms                     Neuro Re-Ed                                                                                                Ther Ex            Raises Scaption  3 x 12 1# 3 x 12 2# 3x 12 2# 3 x 12 2#  B/l 3 x 12 2# 3 x 12 3#  3 x 12 3#    IR/ER   OTB 3 x 12 BTB 3 x 12  GTB 3 x 12 BTB 3 x 12   Bilateral  3 x 12 bilateral  3 x 12 bilateral  3 x 15 BTB B/L 3 x 20 BTB b/l   AAROM   IR 10- x 10 IR 10 x 10 cane  IR 10 x 10 IR/ext 10 x 10sec    IR/ et 10 x 10 sec IR Ext 10 x 10 sec  IR ext 10 x 10 sec    AROM  Flexion and abd      Should press 2 x 8       Scapular retraction   3 x 10 BTB 3 x 12  MTB MTB 3 x 12 MTB 3 x 12       Prone Rows  3 x 10 3# 3 x 12 3# 3 x 12 4# 3 x 12 4#  3 x 8 8#     Chest press       3 x 10 4#   3 x 8 8# 3 x 8 8#   Counter push up        3 x 8 1/2 3 x 8    Irma row     3 x 8 9.5# 3 x 8  9.5 # 13# 3 x 8  14# 3 x 8 14# 3 x 8  14# 3 x 8    OH press      3 x 8 2#  3 x 8 5# 3 x 8 5# 3 x 8  8#   Ther Activity            OH Raise and hold       3 laps 50 feet 2,3, 4 #                 Gait Training                                    Modalities

## 2025-02-05 ENCOUNTER — OFFICE VISIT (OUTPATIENT)
Dept: PHYSICAL THERAPY | Facility: OTHER | Age: 55
End: 2025-02-05
Payer: COMMERCIAL

## 2025-02-05 DIAGNOSIS — M75.101 TEAR OF RIGHT SUPRASPINATUS TENDON: Primary | ICD-10-CM

## 2025-02-05 PROCEDURE — 97112 NEUROMUSCULAR REEDUCATION: CPT | Performed by: PHYSICAL THERAPIST

## 2025-02-05 PROCEDURE — 97110 THERAPEUTIC EXERCISES: CPT | Performed by: PHYSICAL THERAPIST

## 2025-02-05 NOTE — PROGRESS NOTES
Daily Note     Today's date: 2025  Patient name: Lorena Phillips  : 1970  MRN: [id number]  Referring provider: Vance Knox*  Dx:   Encounter Diagnosis     ICD-10-CM    1. Tear of right supraspinatus tendon  M75.101                     Patient was 1 on 1 for 45 mins            Start Time: 1515  Stop Time: 1600  Total time in clinic (min): 45 minutes    Subjective: Patient reports improvements in shoulder function but notes some soreness after last session.       Objective: See treatment diary below.  Reviewed HEP.   Flexion AROM 178 Abd AROM 178, IR AROM 60 deg       Assessment: Tolerated treatment well. Patient with noted fatigue but no increase in pain after todays session.         Plan: Continue plan of care per protocol.       Precautions: RCR  standard protocol,  PROM only 6 weeks. 90 degrees of flexion, 60 degrees of abduction, 30 degrees of ER at 0, IR to the body       Manuals 11/18 12/4 12/11 12/16 12/23 1/6 1/15 1/22 1/27 2/5                PROM PROM            ER and IR PROM   Ms                       Neuro Re-Ed                                                                                                        Ther Ex             Raises Scaption  3 x 12 1# 3 x 12 2# 3x 12 2# 3 x 12 2#  B/l 3 x 12 2# 3 x 12 3#  3 x 12 3#  3 x 8 5#   IR/ER   OTB 3 x 12 BTB 3 x 12  GTB 3 x 12 BTB 3 x 12   Bilateral  3 x 12 bilateral  3 x 12 bilateral  3 x 15 BTB B/L 3 x 20 BTB b/l 3 x 20 BTB   AAROM   IR 10- x 10 IR 10 x 10 cane  IR 10 x 10 IR/ext 10 x 10sec    IR/ et 10 x 10 sec IR Ext 10 x 10 sec  IR ext 10 x 10 sec     Prone Ts           3 x 8 2lbs   Scapular retraction   3 x 10 BTB 3 x 12  MTB MTB 3 x 12 MTB 3 x 12        Prone Rows  3 x 10 3# 3 x 12 3# 3 x 12 4# 3 x 12 4#  3 x 8 8#   3 x 8 8#   Chest press       3 x 10 4#   3 x 8 8# 3 x 8 8# 3 x 8 8#   Counter push up        3 x 8 1/2 3 x 8  3 x 5   Irma row     3 x 8 9.5# 3 x 8  9.5 # 13# 3 x 8  14# 3 x 8 14# 3 x 8  14# 3 x 8  15# 3 x 8     OH press      3 x 8 2#  3 x 8 5# 3 x 8 5# 3 x 8 8#  3x 8 8#   Ther Activity             OH Raise and hold       3 laps 50 feet 2,3, 4 #   2 laps 10 lbs                Gait Training                                       Modalities

## 2025-02-10 ENCOUNTER — OFFICE VISIT (OUTPATIENT)
Dept: PHYSICAL THERAPY | Facility: OTHER | Age: 55
End: 2025-02-10
Payer: COMMERCIAL

## 2025-02-10 DIAGNOSIS — M75.101 TEAR OF RIGHT SUPRASPINATUS TENDON: Primary | ICD-10-CM

## 2025-02-10 PROCEDURE — 97112 NEUROMUSCULAR REEDUCATION: CPT | Performed by: PHYSICAL THERAPIST

## 2025-02-10 PROCEDURE — 97110 THERAPEUTIC EXERCISES: CPT | Performed by: PHYSICAL THERAPIST

## 2025-02-15 DIAGNOSIS — E66.01 CLASS 2 SEVERE OBESITY DUE TO EXCESS CALORIES WITH SERIOUS COMORBIDITY AND BODY MASS INDEX (BMI) OF 35.0 TO 35.9 IN ADULT (HCC): ICD-10-CM

## 2025-02-15 DIAGNOSIS — E66.812 CLASS 2 SEVERE OBESITY DUE TO EXCESS CALORIES WITH SERIOUS COMORBIDITY AND BODY MASS INDEX (BMI) OF 35.0 TO 35.9 IN ADULT (HCC): ICD-10-CM

## 2025-02-17 DIAGNOSIS — E11.40 TYPE 2 DIABETES MELLITUS WITH DIABETIC NEUROPATHY, WITHOUT LONG-TERM CURRENT USE OF INSULIN (HCC): ICD-10-CM

## 2025-02-17 RX ORDER — TIRZEPATIDE 7.5 MG/.5ML
INJECTION, SOLUTION SUBCUTANEOUS
Qty: 2 ML | Refills: 0 | Status: SHIPPED | OUTPATIENT
Start: 2025-02-17

## 2025-02-17 RX ORDER — TIRZEPATIDE 7.5 MG/.5ML
INJECTION, SOLUTION SUBCUTANEOUS
Qty: 2 ML | Refills: 0 | Status: SHIPPED | OUTPATIENT
Start: 2025-02-17 | End: 2025-02-17

## 2025-02-18 ENCOUNTER — TELEPHONE (OUTPATIENT)
Dept: ENDOCRINOLOGY | Facility: CLINIC | Age: 55
End: 2025-02-18

## 2025-02-18 NOTE — TELEPHONE ENCOUNTER
[facility] [employer] 7.5 MG/0.5ML     Key Code   [id number]

## 2025-02-20 NOTE — TELEPHONE ENCOUNTER
INSURANCE NOT ACCURATE. SENT A MESSAGE TO THE PATIENT TO PROVIDE UPDATED INSURANCE INFORMATION

## 2025-02-21 NOTE — TELEPHONE ENCOUNTER
Lexi from [facility] calling in w/ update on PA. [facility] Appeal was overturned and it has been APPROVED. Approval dates 2/21/25- 2/21/26. [facility] she was faxing approval to office as well.   MANFRED

## 2025-02-21 NOTE — TELEPHONE ENCOUNTER
PA for (Mounjaro) 7.5 MG/0.5ML SUBMITTED to [employer]    via    [x]CMM-KEY: [id number]  [x]PA sent as URGENT    All office notes, labs and other pertaining documents and studies sent. Clinical questions answered. Awaiting determination from insurance company.     Turnaround time for your insurance to make a decision on your Prior Authorization can take 7-21 business days.

## 2025-02-24 NOTE — TELEPHONE ENCOUNTER
PA for (Mounjaro) 7.5 MG/0.5ML  APPROVED     Date(s) approved 02/21/25-02/21/26    Case #[id number]    Patient advised by          [x]MyChart Message  []Phone call   []LMOM  []L/M to call office as no active Communication consent on file  []Unable to leave detailed message as VM not approved on Communication consent       Pharmacy advised by    [x]Fax  []Phone call    Specialty Pharmacy    []      Approval letter scanned into [employer] Yes

## 2025-02-28 ENCOUNTER — OFFICE VISIT (OUTPATIENT)
Age: 55
End: 2025-02-28
Payer: COMMERCIAL

## 2025-02-28 VITALS
DIASTOLIC BLOOD PRESSURE: 72 MMHG | HEIGHT: 62 IN | TEMPERATURE: 96.7 F | WEIGHT: 169 LBS | SYSTOLIC BLOOD PRESSURE: 124 MMHG | BODY MASS INDEX: 31.1 KG/M2 | OXYGEN SATURATION: 99 % | HEART RATE: 51 BPM

## 2025-02-28 DIAGNOSIS — K29.70 GASTRITIS WITHOUT BLEEDING, UNSPECIFIED CHRONICITY, UNSPECIFIED GASTRITIS TYPE: ICD-10-CM

## 2025-02-28 DIAGNOSIS — E78.5 HYPERLIPIDEMIA, UNSPECIFIED HYPERLIPIDEMIA TYPE: ICD-10-CM

## 2025-02-28 DIAGNOSIS — E78.1 HYPERTRIGLYCERIDEMIA: ICD-10-CM

## 2025-02-28 DIAGNOSIS — E11.40 TYPE 2 DIABETES MELLITUS WITH DIABETIC NEUROPATHY, WITHOUT LONG-TERM CURRENT USE OF INSULIN (HCC): Primary | ICD-10-CM

## 2025-02-28 PROCEDURE — 99396 PREV VISIT EST AGE 40-64: CPT | Performed by: INTERNAL MEDICINE

## 2025-02-28 NOTE — PROGRESS NOTES
Name: Lorena Phillips      : 1970      MRN: [id number]  Encounter Provider: Yo Tanner MD  Encounter Date: 2025   Encounter department: [facility] INTERNAL MEDICINE    Assessment & Plan  Gastritis without bleeding, unspecified chronicity, unspecified gastritis type  No recent gastritis or acid reflux symptoms recommend continuation of omeprazole 20 mg daily         Type 2 diabetes mellitus with diabetic neuropathy, without long-term current use of insulin (HCC)  Patient working on weight reduction with Mounjaro has reached somewhat of a plateau will discuss with prescribing physician.  Continue efforts to control carbohydrate consumption advised updated blood work including fasting blood sugar and hemoglobin A1c have been requested.  Continue gabapentin for diabetic neuropathy symptoms  Lab Results   Component Value Date    HGBA1C 5.7 (H) 10/20/2024            Hyperlipidemia, unspecified hyperlipidemia type  Updated lipid profile has been requested continue low-cholesterol diet diet as well as Zetia 10 mg daily and Nancie CIWA 2 g daily         Hypertriglyceridemia  Triglyceride values reviewed continue low carbohydrate diet and fenofibrate 145 mg daily              History of Present Illness     This 54-year-old female patient returns to our office today for annual physical examination.  I provided the patient with request for comprehensive blood work to go with today's examination.  Results will be reviewed with the patient upon completion.  Patient has no complaints on today's visit denies any recent infections has had no recent cardiac symptoms of chest pain palpitations shortness of breath.      Review of Systems   All other systems reviewed and are negative.    Past Medical History:   Diagnosis Date    Allergic     seasonal    Anxiety     CPAP (continuous positive airway pressure) dependence     Diabetes mellitus (HCC)     GERD (gastroesophageal reflux disease)      Hypertension 2015    Sleep apnea      Past Surgical History:   Procedure Laterality Date    BELPHAROPTOSIS REPAIR Bilateral     2022    CARPAL TUNNEL RELEASE Bilateral     COLONOSCOPY  2018    10 years per pt    EGD      ENDOMETRIAL ABLATION W/ NOVASURE      HYSTERECTOMY  2006    still has 1 ovary-?side    NASAL SEPTUM SURGERY      SC SURGICAL ARTHROSCOPY SHOULDER W/ROTATOR CUFF RPR Right 2024    Procedure: SHOULDER ARTHROSCOPIC ROTATOR CUFF REPAIR, SUBACROMIAL DECOMPRESSION, BICEP TENODESIS;  Surgeon: Vance Knox MD;  Location: [facility] MAIN OR;  Service: Orthopedics     Family History   Problem Relation Age of Onset    Anxiety disorder Mother     Stroke Mother     Lymphoma Father     Hypertension Father             Diabetes Father             Cancer Father     No Known Problems Sister     Stomach cancer Maternal Grandmother 48    Cancer Maternal Grandmother     Lung cancer Maternal Grandfather 80    Cancer Maternal Grandfather     No Known Problems Paternal Grandmother     Heart disease Paternal Grandfather     No Known Problems Maternal Aunt     No Known Problems Maternal Aunt     No Known Problems Paternal Aunt     No Known Problems Paternal Aunt     Leukemia Paternal Uncle 39    Lung cancer Paternal Uncle 75    Coronary artery disease Paternal Aunt     Thyroid disease Paternal Aunt     Diabetes Brother         Type 2     Social History     Tobacco Use    Smoking status: Never     Passive exposure: Never    Smokeless tobacco: Never   Vaping Use    Vaping status: Never Used   Substance and Sexual Activity    Alcohol use: Not Currently    Drug use: Never    Sexual activity: Yes     Partners: Female     Birth control/protection: Post-menopausal     Current Outpatient Medications on File Prior to Visit   Medication Sig    bromocriptine (PARLODEL) 2.5 mg tablet Take 1 tablet (2.5 mg total) by mouth daily    cetirizine (ZyrTEC) 10 MG chewable tablet Chew 10 mg daily     "estradiol (ESTRACE) 1 mg tablet Take 1 tablet (1 mg total) by mouth daily    ezetimibe (ZETIA) 10 mg tablet Take 1 tablet (10 mg total) by mouth daily    fenofibrate (TRICOR) 145 mg tablet Take 1 tablet (145 mg total) by mouth daily    gabapentin (NEURONTIN) 100 mg capsule Take 2 capsules (200 mg total) by mouth 2 (two) times a day (Patient taking differently: Take 200 mg by mouth 2 (two) times a day 200mg dinner 300mg at bedtime)    Icosapent Ethyl (Vascepa) 1 g CAPS Take 2 capsules (2 g total) by mouth in the morning    multivitamin (THERAGRAN) TABS Take 1 tablet by mouth daily    omeprazole (PriLOSEC) 20 mg delayed release capsule Take 1 capsule (20 mg total) by mouth daily    Tirzepatide (Mounjaro) 7.5 MG/0.5ML SOAJ 7.5 mg weekly    Vitamin D, Cholecalciferol, 50 MCG (2000 UT) CAPS Take by mouth    valACYclovir (VALTREX) 1,000 mg tablet Take 1 tablet (1,000 mg total) by mouth 2 (two) times a day for 5 days     Allergies   Allergen Reactions    Statins Irritability, Lightheadedness and Other (See Comments)     Immunization History   Administered Date(s) Administered    COVID-19 MODERNA VACC 0.5 ML IM 01/13/2021, 02/10/2021, 06/16/2022    COVID-19 Pfizer mRNA vacc PF ori-sucrose 12 yr and older (Comirnaty) 10/24/2024    INFLUENZA 10/29/2013, 10/15/2014, 10/01/2015, 10/10/2016, 10/13/2017, 11/02/2018, 10/01/2019, 10/23/2020, 11/04/2021, 10/25/2022, 10/13/2023    Influenza, injectable, quadrivalent, preservative free 0.5 mL 10/13/2023    Pneumococcal Polysaccharide PPV23 02/26/2020    Tdap 04/04/2019    Tuberculin Skin Test-PPD Intradermal 11/03/2010, 10/13/2017, 04/18/2022, 08/13/2024    Zoster Vaccine Recombinant 04/19/2019, 07/05/2019     Objective   /72   Pulse (!) 51   Temp (!) 96.7 °F (35.9 °C) (Tympanic)   Ht 5' 1.5\" (1.562 m)   Wt 76.7 kg (169 lb)   SpO2 99%   BMI 31.42 kg/m²     Physical Exam  Vitals and nursing note reviewed.   Constitutional:       General: She is not in acute distress.     " Appearance: She is well-developed.   HENT:      Head: Normocephalic and atraumatic.      Right Ear: Tympanic membrane, ear canal and external ear normal.      Left Ear: Tympanic membrane, ear canal and external ear normal.      Nose: Nose normal.      Mouth/Throat:      Mouth: Mucous membranes are moist.      Pharynx: Oropharynx is clear.   Eyes:      Conjunctiva/sclera: Conjunctivae normal.      Pupils: Pupils are equal, round, and reactive to light.   Neck:      Vascular: No carotid bruit.   Cardiovascular:      Rate and Rhythm: Normal rate and regular rhythm.      Heart sounds: Normal heart sounds. No murmur heard.  Pulmonary:      Effort: Pulmonary effort is normal. No respiratory distress.      Breath sounds: Normal breath sounds. No wheezing, rhonchi or rales.   Abdominal:      General: Bowel sounds are normal. There is no distension.      Palpations: Abdomen is soft. There is no mass.      Tenderness: There is no abdominal tenderness. There is no guarding.   Musculoskeletal:      Cervical back: Normal range of motion and neck supple. No rigidity or tenderness.      Right lower leg: No edema.      Left lower leg: No edema.   Lymphadenopathy:      Cervical: No cervical adenopathy.   Skin:     General: Skin is warm and dry.      Capillary Refill: Capillary refill takes less than 2 seconds.      Coloration: Skin is not jaundiced.   Neurological:      General: No focal deficit present.      Mental Status: She is alert. Mental status is at baseline.   Psychiatric:         Mood and Affect: Mood normal.         Behavior: Behavior normal.         Thought Content: Thought content normal.         Judgment: Judgment normal.

## 2025-02-28 NOTE — ASSESSMENT & PLAN NOTE
No recent gastritis or acid reflux symptoms recommend continuation of omeprazole 20 mg daily

## 2025-02-28 NOTE — ASSESSMENT & PLAN NOTE
Patient working on weight reduction with Mounjaro has reached somewhat of a plateau will discuss with prescribing physician.  Continue efforts to control carbohydrate consumption advised updated blood work including fasting blood sugar and hemoglobin A1c have been requested.  Continue gabapentin for diabetic neuropathy symptoms  Lab Results   Component Value Date    HGBA1C 5.7 (H) 10/20/2024

## 2025-02-28 NOTE — ASSESSMENT & PLAN NOTE
Updated lipid profile has been requested continue low-cholesterol diet diet as well as Zetia 10 mg daily and Nancie CIWA 2 g daily

## 2025-03-15 DIAGNOSIS — E11.40 TYPE 2 DIABETES MELLITUS WITH DIABETIC NEUROPATHY, WITHOUT LONG-TERM CURRENT USE OF INSULIN (HCC): ICD-10-CM

## 2025-03-17 RX ORDER — TIRZEPATIDE 7.5 MG/.5ML
INJECTION, SOLUTION SUBCUTANEOUS
Qty: 2 ML | Refills: 0 | Status: SHIPPED | OUTPATIENT
Start: 2025-03-17

## 2025-04-04 ENCOUNTER — APPOINTMENT (OUTPATIENT)
Dept: LAB | Age: 55
End: 2025-04-04
Payer: COMMERCIAL

## 2025-04-04 DIAGNOSIS — E78.5 HYPERLIPIDEMIA, UNSPECIFIED HYPERLIPIDEMIA TYPE: ICD-10-CM

## 2025-04-04 DIAGNOSIS — E11.40 TYPE 2 DIABETES MELLITUS WITH DIABETIC NEUROPATHY, WITHOUT LONG-TERM CURRENT USE OF INSULIN (HCC): ICD-10-CM

## 2025-04-04 DIAGNOSIS — E78.1 HYPERTRIGLYCERIDEMIA: ICD-10-CM

## 2025-04-04 LAB
ALBUMIN SERPL BCG-MCNC: 4.2 G/DL (ref 3.5–5)
ALP SERPL-CCNC: 28 U/L (ref 34–104)
ALT SERPL W P-5'-P-CCNC: 12 U/L (ref 7–52)
ANION GAP SERPL CALCULATED.3IONS-SCNC: 8 MMOL/L (ref 4–13)
AST SERPL W P-5'-P-CCNC: 18 U/L (ref 13–39)
BASOPHILS # BLD AUTO: 0.04 THOUSANDS/ÂΜL (ref 0–0.1)
BASOPHILS NFR BLD AUTO: 1 % (ref 0–1)
BILIRUB SERPL-MCNC: 0.39 MG/DL (ref 0.2–1)
BUN SERPL-MCNC: 19 MG/DL (ref 5–25)
CALCIUM SERPL-MCNC: 8.9 MG/DL (ref 8.4–10.2)
CHLORIDE SERPL-SCNC: 104 MMOL/L (ref 96–108)
CHOLEST SERPL-MCNC: 142 MG/DL (ref ?–200)
CO2 SERPL-SCNC: 29 MMOL/L (ref 21–32)
CREAT SERPL-MCNC: 0.78 MG/DL (ref 0.6–1.3)
CREAT UR-MCNC: 79.1 MG/DL
EOSINOPHIL # BLD AUTO: 0.13 THOUSAND/ÂΜL (ref 0–0.61)
EOSINOPHIL NFR BLD AUTO: 3 % (ref 0–6)
ERYTHROCYTE [DISTWIDTH] IN BLOOD BY AUTOMATED COUNT: 12.9 % (ref 11.6–15.1)
EST. AVERAGE GLUCOSE BLD GHB EST-MCNC: 108 MG/DL
GFR SERPL CREATININE-BSD FRML MDRD: 84 ML/MIN/1.73SQ M
GLUCOSE P FAST SERPL-MCNC: 82 MG/DL (ref 65–99)
HBA1C MFR BLD: 5.4 %
HCT VFR BLD AUTO: 38.6 % (ref 34.8–46.1)
HDLC SERPL-MCNC: 41 MG/DL
HGB BLD-MCNC: 13 G/DL (ref 11.5–15.4)
IMM GRANULOCYTES # BLD AUTO: 0.01 THOUSAND/UL (ref 0–0.2)
IMM GRANULOCYTES NFR BLD AUTO: 0 % (ref 0–2)
LDLC SERPL CALC-MCNC: 79 MG/DL (ref 0–100)
LYMPHOCYTES # BLD AUTO: 2.36 THOUSANDS/ÂΜL (ref 0.6–4.47)
LYMPHOCYTES NFR BLD AUTO: 50 % (ref 14–44)
MCH RBC QN AUTO: 29.5 PG (ref 26.8–34.3)
MCHC RBC AUTO-ENTMCNC: 33.7 G/DL (ref 31.4–37.4)
MCV RBC AUTO: 88 FL (ref 82–98)
MICROALBUMIN UR-MCNC: <7 MG/L
MONOCYTES # BLD AUTO: 0.3 THOUSAND/ÂΜL (ref 0.17–1.22)
MONOCYTES NFR BLD AUTO: 6 % (ref 4–12)
NEUTROPHILS # BLD AUTO: 1.92 THOUSANDS/ÂΜL (ref 1.85–7.62)
NEUTS SEG NFR BLD AUTO: 40 % (ref 43–75)
NRBC BLD AUTO-RTO: 0 /100 WBCS
PLATELET # BLD AUTO: 196 THOUSANDS/UL (ref 149–390)
PMV BLD AUTO: 10.4 FL (ref 8.9–12.7)
POTASSIUM SERPL-SCNC: 4 MMOL/L (ref 3.5–5.3)
PROT SERPL-MCNC: 6.5 G/DL (ref 6.4–8.4)
RBC # BLD AUTO: 4.41 MILLION/UL (ref 3.81–5.12)
SODIUM SERPL-SCNC: 141 MMOL/L (ref 135–147)
TRIGL SERPL-MCNC: 111 MG/DL (ref ?–150)
TSH SERPL DL<=0.05 MIU/L-ACNC: 3.19 UIU/ML (ref 0.45–4.5)
WBC # BLD AUTO: 4.76 THOUSAND/UL (ref 4.31–10.16)

## 2025-04-04 PROCEDURE — 36415 COLL VENOUS BLD VENIPUNCTURE: CPT

## 2025-04-04 PROCEDURE — 82043 UR ALBUMIN QUANTITATIVE: CPT

## 2025-04-04 PROCEDURE — 84443 ASSAY THYROID STIM HORMONE: CPT

## 2025-04-04 PROCEDURE — 82570 ASSAY OF URINE CREATININE: CPT

## 2025-04-04 PROCEDURE — 83036 HEMOGLOBIN GLYCOSYLATED A1C: CPT

## 2025-04-04 PROCEDURE — 80061 LIPID PANEL: CPT

## 2025-04-04 PROCEDURE — 80053 COMPREHEN METABOLIC PANEL: CPT

## 2025-04-04 PROCEDURE — 85025 COMPLETE CBC W/AUTO DIFF WBC: CPT

## 2025-04-07 ENCOUNTER — RESULTS FOLLOW-UP (OUTPATIENT)
Dept: ENDOCRINOLOGY | Facility: CLINIC | Age: 55
End: 2025-04-07

## 2025-04-11 ENCOUNTER — OFFICE VISIT (OUTPATIENT)
Age: 55
End: 2025-04-11

## 2025-04-11 VITALS
HEART RATE: 61 BPM | TEMPERATURE: 97.2 F | OXYGEN SATURATION: 94 % | BODY MASS INDEX: 31.5 KG/M2 | SYSTOLIC BLOOD PRESSURE: 124 MMHG | HEIGHT: 62 IN | WEIGHT: 171.2 LBS | DIASTOLIC BLOOD PRESSURE: 70 MMHG

## 2025-04-11 DIAGNOSIS — E78.5 HYPERLIPIDEMIA, UNSPECIFIED HYPERLIPIDEMIA TYPE: ICD-10-CM

## 2025-04-11 DIAGNOSIS — K29.70 GASTRITIS WITHOUT BLEEDING, UNSPECIFIED CHRONICITY, UNSPECIFIED GASTRITIS TYPE: ICD-10-CM

## 2025-04-11 DIAGNOSIS — E11.40 TYPE 2 DIABETES MELLITUS WITH DIABETIC NEUROPATHY, WITHOUT LONG-TERM CURRENT USE OF INSULIN (HCC): Primary | ICD-10-CM

## 2025-04-11 DIAGNOSIS — G62.9 NEUROPATHY: ICD-10-CM

## 2025-04-11 DIAGNOSIS — E78.1 HYPERTRIGLYCERIDEMIA: ICD-10-CM

## 2025-04-11 PROBLEM — M25.511 ACUTE PAIN OF RIGHT SHOULDER: Status: RESOLVED | Noted: 2024-06-28 | Resolved: 2025-04-11

## 2025-04-11 PROBLEM — Z98.890 S/P RIGHT ROTATOR CUFF REPAIR: Status: RESOLVED | Noted: 2024-09-16 | Resolved: 2025-04-11

## 2025-04-11 PROBLEM — M75.101 TEAR OF RIGHT SUPRASPINATUS TENDON: Status: RESOLVED | Noted: 2024-07-25 | Resolved: 2025-04-11

## 2025-04-11 PROBLEM — M25.561 ACUTE PAIN OF RIGHT KNEE: Status: RESOLVED | Noted: 2024-06-28 | Resolved: 2025-04-11

## 2025-04-11 RX ORDER — EZETIMIBE 10 MG/1
10 TABLET ORAL DAILY
Qty: 90 TABLET | Refills: 3 | Status: SHIPPED | OUTPATIENT
Start: 2025-04-11

## 2025-04-11 RX ORDER — GABAPENTIN 100 MG/1
CAPSULE ORAL
Qty: 150 CAPSULE | Refills: 4 | Status: SHIPPED | OUTPATIENT
Start: 2025-04-11 | End: 2025-04-11

## 2025-04-11 RX ORDER — GABAPENTIN 100 MG/1
CAPSULE ORAL
Qty: 60 CAPSULE | Refills: 4 | Status: SHIPPED | OUTPATIENT
Start: 2025-04-11 | End: 2025-04-11 | Stop reason: SDUPTHER

## 2025-04-11 RX ORDER — ICOSAPENT ETHYL 1 G/1
1 CAPSULE ORAL DAILY
Qty: 90 CAPSULE | Refills: 2 | Status: SHIPPED | OUTPATIENT
Start: 2025-04-11

## 2025-04-11 RX ORDER — OMEPRAZOLE 20 MG/1
20 CAPSULE, DELAYED RELEASE ORAL DAILY
Qty: 90 CAPSULE | Refills: 3 | Status: SHIPPED | OUTPATIENT
Start: 2025-04-11

## 2025-04-11 RX ORDER — GABAPENTIN 100 MG/1
CAPSULE ORAL
Qty: 180 CAPSULE | Refills: 3 | Status: SHIPPED | OUTPATIENT
Start: 2025-04-11

## 2025-04-11 NOTE — ASSESSMENT & PLAN NOTE
Triglycerides reviewed continue Tricor 145 mg daily along with low carbohydrate diet and exercise along with weight reduction    Orders:    Icosapent Ethyl (Vascepa) 1 g CAPS; Take 1 capsule (1 g total) by mouth in the morning

## 2025-04-11 NOTE — ASSESSMENT & PLAN NOTE
Symptoms stable continue omeprazole 20 mg daily    Orders:    omeprazole (PriLOSEC) 20 mg delayed release capsule; Take 1 capsule (20 mg total) by mouth daily

## 2025-04-11 NOTE — ASSESSMENT & PLAN NOTE
Current fasting blood sugar is 82 hemoglobin A1c reading 5.4% confirming good lifestyle management of type 2 diabetes along with continuation of Mounjaro for both diabetic control and weight reduction  Lab Results   Component Value Date    HGBA1C 5.4 04/04/2025       Orders:    Comprehensive metabolic panel; Future    Hemoglobin A1C; Future

## 2025-04-11 NOTE — ASSESSMENT & PLAN NOTE
Lipid profile reviewed continue low-cholesterol diet regular exercise and weight reduction also continue Zetia 10 mg daily along with Nancie CIWA 1 g daily    Orders:    ezetimibe (ZETIA) 10 mg tablet; Take 1 tablet (10 mg total) by mouth daily    Lipid panel; Future

## 2025-04-11 NOTE — PROGRESS NOTES
Name: Lorena Phillips      : 1970      MRN: [id number]  Encounter Provider: Yo Tanner MD  Encounter Date: 2025   Encounter department: [facility] INTERNAL MEDICINE    Assessment & Plan  Neuropathy    Orders:    gabapentin (NEURONTIN) 100 mg capsule; 200mg at bedtime    Hypertriglyceridemia  Triglycerides reviewed continue Tricor 145 mg daily along with low carbohydrate diet and exercise along with weight reduction    Orders:    Icosapent Ethyl (Vascepa) 1 g CAPS; Take 1 capsule (1 g total) by mouth in the morning    Gastritis without bleeding, unspecified chronicity, unspecified gastritis type  Symptoms stable continue omeprazole 20 mg daily    Orders:    omeprazole (PriLOSEC) 20 mg delayed release capsule; Take 1 capsule (20 mg total) by mouth daily    Hyperlipidemia, unspecified hyperlipidemia type  Lipid profile reviewed continue low-cholesterol diet regular exercise and weight reduction also continue Zetia 10 mg daily along with Nancie CIWA 1 g daily    Orders:    ezetimibe (ZETIA) 10 mg tablet; Take 1 tablet (10 mg total) by mouth daily    Lipid panel; Future    Type 2 diabetes mellitus with diabetic neuropathy, without long-term current use of insulin (HCC)  Current fasting blood sugar is 82 hemoglobin A1c reading 5.4% confirming good lifestyle management of type 2 diabetes along with continuation of Mounjaro for both diabetic control and weight reduction  Lab Results   Component Value Date    HGBA1C 5.4 2025       Orders:    Comprehensive metabolic panel; Future    Hemoglobin A1C; Future         History of Present Illness     This pleasant 55-year-old female patient presents today for 6-month checkup visit.  Indicates she has been feeling well with no complaints      Review of Systems   All other systems reviewed and are negative.    Past Medical History:   Diagnosis Date    Allergic     seasonal    Anxiety     CPAP (continuous positive airway pressure) dependence      Diabetes mellitus (HCC)     GERD (gastroesophageal reflux disease) 2015    High arches 2010    Hypertension 2015    Neuropathy in diabetes (HCC) 2015    Sleep apnea      Past Surgical History:   Procedure Laterality Date    BELPHAROPTOSIS REPAIR Bilateral     2022    CARPAL TUNNEL RELEASE Bilateral     COLONOSCOPY  2018    10 years per pt    EGD      ENDOMETRIAL ABLATION W/ NOVASURE      HYSTERECTOMY  2006    still has 1 ovary-?side    NASAL SEPTUM SURGERY      RI SURGICAL ARTHROSCOPY SHOULDER W/ROTATOR CUFF RPR Right 2024    Procedure: SHOULDER ARTHROSCOPIC ROTATOR CUFF REPAIR, SUBACROMIAL DECOMPRESSION, BICEP TENODESIS;  Surgeon: Vance Knox MD;  Location: [facility] MAIN OR;  Service: Orthopedics    WRIST SURGERY      Carpal tunnel both wrists     Family History   Problem Relation Age of Onset    Anxiety disorder Mother     Stroke Mother     Lymphoma Father     Hypertension Father             Diabetes Father             Cancer Father     Diabetes type II Father             No Known Problems Sister     Stomach cancer Maternal Grandmother 48    Cancer Maternal Grandmother     Lung cancer Maternal Grandfather 80    Cancer Maternal Grandfather     No Known Problems Paternal Grandmother     Heart disease Paternal Grandfather     No Known Problems Maternal Aunt     No Known Problems Maternal Aunt     No Known Problems Paternal Aunt     No Known Problems Paternal Aunt     Leukemia Paternal Uncle 39    Lung cancer Paternal Uncle 75    Heart disease Paternal Uncle     Coronary artery disease Paternal Aunt     Thyroid disease Paternal Aunt     Arthritis Paternal Aunt     Hypothyroidism Paternal Aunt     Diabetes Brother         Type 2     Social History     Tobacco Use    Smoking status: Never     Passive exposure: Never    Smokeless tobacco: Never   Vaping Use    Vaping status: Never Used   Substance and Sexual Activity    Alcohol use: Not Currently    Drug  use: Never    Sexual activity: Yes     Partners: Female     Birth control/protection: Post-menopausal     Current Outpatient Medications on File Prior to Visit   Medication Sig    bromocriptine (PARLODEL) 2.5 mg tablet Take 1 tablet (2.5 mg total) by mouth daily    cetirizine (ZyrTEC) 10 MG chewable tablet Chew 10 mg daily    estradiol (ESTRACE) 1 mg tablet Take 1 tablet (1 mg total) by mouth daily    fenofibrate (TRICOR) 145 mg tablet Take 1 tablet (145 mg total) by mouth daily    multivitamin (THERAGRAN) TABS Take 1 tablet by mouth daily    Tirzepatide (Mounjaro) 7.5 MG/0.5ML SOAJ 7.5 mg weekly    Vitamin D, Cholecalciferol, 50 MCG (2000 UT) CAPS Take by mouth    [DISCONTINUED] ezetimibe (ZETIA) 10 mg tablet Take 1 tablet (10 mg total) by mouth daily    [DISCONTINUED] gabapentin (NEURONTIN) 100 mg capsule Take 2 capsules (200 mg total) by mouth 2 (two) times a day (Patient taking differently: Take 200 mg by mouth 2 (two) times a day 200mg dinner 300mg at bedtime)    [DISCONTINUED] Icosapent Ethyl (Vascepa) 1 g CAPS Take 2 capsules (2 g total) by mouth in the morning    [DISCONTINUED] omeprazole (PriLOSEC) 20 mg delayed release capsule Take 1 capsule (20 mg total) by mouth daily    valACYclovir (VALTREX) 1,000 mg tablet Take 1 tablet (1,000 mg total) by mouth 2 (two) times a day for 5 days     Allergies   Allergen Reactions    Statins Irritability, Lightheadedness and Other (See Comments)     Immunization History   Administered Date(s) Administered    COVID-19 MODERNA VACC 0.5 ML IM 01/13/2021, 02/10/2021, 06/16/2022    COVID-19 Pfizer mRNA vacc PF ori-sucrose 12 yr and older (Comirnaty) 10/24/2024    INFLUENZA 10/29/2013, 10/15/2014, 10/01/2015, 10/10/2016, 10/13/2017, 11/02/2018, 10/01/2019, 10/23/2020, 11/04/2021, 10/25/2022, 10/13/2023    Influenza, injectable, quadrivalent, preservative free 0.5 mL 10/13/2023    Pneumococcal Polysaccharide PPV23 02/26/2020    Tdap 04/04/2019    Tuberculin Skin Test-PPD  "Intradermal 11/03/2010, 10/13/2017, 04/18/2022, 08/13/2024    Zoster Vaccine Recombinant 04/19/2019, 07/05/2019     Objective   /70   Pulse 61   Temp (!) 97.2 °F (36.2 °C) (Tympanic)   Ht 5' 1.5\" (1.562 m)   Wt 77.7 kg (171 lb 3.2 oz)   SpO2 94%   BMI 31.82 kg/m²     Physical Exam  Vitals and nursing note reviewed.   Constitutional:       General: She is not in acute distress.     Appearance: She is well-developed.   HENT:      Head: Normocephalic and atraumatic.   Eyes:      Conjunctiva/sclera: Conjunctivae normal.   Cardiovascular:      Rate and Rhythm: Normal rate and regular rhythm.      Heart sounds: Normal heart sounds. No murmur heard.  Pulmonary:      Effort: Pulmonary effort is normal. No respiratory distress.      Breath sounds: Normal breath sounds. No wheezing, rhonchi or rales.   Abdominal:      Palpations: Abdomen is soft.   Musculoskeletal:         General: No swelling.      Cervical back: Neck supple.   Skin:     General: Skin is warm and dry.      Capillary Refill: Capillary refill takes less than 2 seconds.   Neurological:      General: No focal deficit present.      Mental Status: She is alert. Mental status is at baseline.   Psychiatric:         Mood and Affect: Mood normal.         "

## 2025-04-12 DIAGNOSIS — E11.40 TYPE 2 DIABETES MELLITUS WITH DIABETIC NEUROPATHY, WITHOUT LONG-TERM CURRENT USE OF INSULIN (HCC): ICD-10-CM

## 2025-04-14 RX ORDER — TIRZEPATIDE 7.5 MG/.5ML
INJECTION, SOLUTION SUBCUTANEOUS
Qty: 2 ML | Refills: 1 | Status: SHIPPED | OUTPATIENT
Start: 2025-04-14

## 2025-06-06 ENCOUNTER — OFFICE VISIT (OUTPATIENT)
Dept: ENDOCRINOLOGY | Facility: CLINIC | Age: 55
End: 2025-06-06
Payer: COMMERCIAL

## 2025-06-06 VITALS
DIASTOLIC BLOOD PRESSURE: 80 MMHG | WEIGHT: 163 LBS | OXYGEN SATURATION: 98 % | HEIGHT: 61 IN | BODY MASS INDEX: 30.78 KG/M2 | HEART RATE: 68 BPM | SYSTOLIC BLOOD PRESSURE: 120 MMHG

## 2025-06-06 DIAGNOSIS — E11.40 TYPE 2 DIABETES MELLITUS WITH DIABETIC NEUROPATHY, WITHOUT LONG-TERM CURRENT USE OF INSULIN (HCC): Primary | ICD-10-CM

## 2025-06-06 DIAGNOSIS — E78.5 HYPERLIPIDEMIA, UNSPECIFIED HYPERLIPIDEMIA TYPE: ICD-10-CM

## 2025-06-06 PROCEDURE — 99214 OFFICE O/P EST MOD 30 MIN: CPT

## 2025-06-06 RX ORDER — TIRZEPATIDE 10 MG/.5ML
INJECTION, SOLUTION SUBCUTANEOUS
Qty: 2 ML | Refills: 2 | Status: SHIPPED | OUTPATIENT
Start: 2025-06-06

## 2025-06-06 NOTE — PROGRESS NOTES
Name: Lorena Phillips      : 1970      MRN: [id number]  Encounter Provider: CESAR Cagle  Encounter Date: 2025   Encounter department: [facility] [place]    Chief Complaint   Patient presents with    Diabetes Type 2     Assessment & Plan  Type 2 diabetes mellitus-well-controlled but we will increase to 10 mg/week to help with additional weight loss.   Hyperlipidemia- well-controlled. Actively titrating medications down with PCP. She may stop vascepa at this point. Last trigs 111 on 2025    History of Present Illness  This is a 55-year-old female here for follow-up of type 2 diabetes.  She has had diabetes for over 12 years. Her diabetes is complicated by neuropathy- treated with gabapentin. Has been able to titrate down on dose.   Her Mounjaro was increased to 7.5 mg/week at the last visit.  Per chart review, patient was previously on Januvia.  No history of pancreatitis.    Reports increased appetite with increased activity. Does not note any weight loss with increased exercise and healthy diet. She maintains adequate protein intake.     Has RLS- on bromocriptine  History of high triglycerides- on fenofibrate and vascepa 1 g daily    +diminished breath sounds  No SOB.    Last Eye Exam: UTD, 2024  Last Foot Exam: 10/16/2024    Health Maintenance   Topic Date Due    Diabetic Foot Exam  10/16/2025    Diabetic Eye Exam  11/10/2025         Review of Systems   Constitutional:  Negative for chills and fever.   HENT:  Negative for ear pain and sore throat.    Eyes:  Negative for pain and visual disturbance.   Respiratory:  Negative for cough and shortness of breath.    Cardiovascular:  Negative for chest pain and palpitations.   Gastrointestinal:  Negative for abdominal pain and vomiting.   Genitourinary:  Negative for dysuria and hematuria.   Musculoskeletal:  Negative for arthralgias and back pain.   Skin:  Negative for color change and rash.  "  Neurological:  Negative for seizures and syncope.   All other systems reviewed and are negative.   as per HPI         Medical History Reviewed by provider this encounter:     .    Objective   /80   Pulse 68   Ht 5' 1\" (1.549 m)   Wt 73.9 kg (163 lb)   SpO2 98%   BMI 30.80 kg/m²      Body mass index is 30.8 kg/m².  Wt Readings from Last 3 Encounters:   06/06/25 73.9 kg (163 lb)   04/11/25 77.7 kg (171 lb 3.2 oz)   02/28/25 76.7 kg (169 lb)     Physical Exam    Physical Exam  Vitals reviewed.   HENT:      Head: Normocephalic and atraumatic.     Cardiovascular:      Rate and Rhythm: Normal rate.   Pulmonary:      Effort: Pulmonary effort is normal.     Neurological:      Mental Status: She is alert.           Results    Labs:   Lab Results   Component Value Date    HGBA1C 5.4 04/04/2025    HGBA1C 5.7 (H) 10/20/2024    HGBA1C 6.3 (H) 06/25/2024     Lab Results   Component Value Date    CREATININE 0.78 04/04/2025    CREATININE 0.73 10/20/2024    CREATININE 0.79 06/25/2024    BUN 19 04/04/2025    K 4.0 04/04/2025     04/04/2025    CO2 29 04/04/2025     GFR, Calculated   Date Value Ref Range Status   05/08/2020 93 >60 mL/min/1.73m2 Final     Comment:     mL/min per 1.73 square meters                                            Normal Function or Mild Renal    Disease (if clinically at risk):  >or=60  Moderately Decreased:                30-59  Severely Decreased:                  15-29  Renal Failure:                         <15                                            -American GFR: multiply reported GFR by 1.16    Please note that the eGFR is based on the CKD-EPI calculation, and is not intended to be used for drug dosing.                                            Note: Calculated GFR may not be an accurate indicator of renal function if the patient's renal function is not in a steady state.     eGFRcr   Date Value Ref Range Status   12/08/2022 85 >59 Final     eGFR   Date Value Ref Range " Status   04/04/2025 84 ml/min/1.73sq m Final     Lab Results   Component Value Date    CHOL 165 09/23/2015    HDL 41 (L) 04/04/2025    TRIG 111 04/04/2025     Lab Results   Component Value Date    ALT 12 04/04/2025    AST 18 04/04/2025    ALKPHOS 28 (L) 04/04/2025     Lab Results   Component Value Date    CEH2SXMQASFG 3.185 04/04/2025       There are no Patient Instructions on file for this visit.    Discussed with the patient and all questioned fully answered. She will call me if any problems arise.

## 2025-06-28 DIAGNOSIS — G25.81 RESTLESS LEG SYNDROME: ICD-10-CM

## 2025-06-28 DIAGNOSIS — E78.5 HYPERLIPIDEMIA, UNSPECIFIED HYPERLIPIDEMIA TYPE: ICD-10-CM

## 2025-07-01 RX ORDER — FENOFIBRATE 145 MG/1
145 TABLET, FILM COATED ORAL DAILY
Qty: 90 TABLET | Refills: 1 | Status: SHIPPED | OUTPATIENT
Start: 2025-07-01 | End: 2026-07-01

## 2025-07-01 RX ORDER — BROMOCRIPTINE MESYLATE 2.5 MG/1
2.5 TABLET ORAL DAILY
Qty: 90 TABLET | Refills: 2 | Status: SHIPPED | OUTPATIENT
Start: 2025-07-01

## 2025-07-25 ENCOUNTER — HOSPITAL ENCOUNTER (OUTPATIENT)
Dept: RADIOLOGY | Age: 55
Discharge: HOME | End: 2025-07-25
Payer: COMMERCIAL

## 2025-07-25 VITALS — BODY MASS INDEX: 30.78 KG/M2 | WEIGHT: 163 LBS | HEIGHT: 61 IN

## 2025-07-25 DIAGNOSIS — Z12.31 VISIT FOR SCREENING MAMMOGRAM: ICD-10-CM

## 2025-07-25 PROCEDURE — 77067 SCR MAMMO BI INCL CAD: CPT

## 2025-07-25 PROCEDURE — 77063 BREAST TOMOSYNTHESIS BI: CPT

## (undated) DEVICE — SHOULDER SUSPENSION KIT 6 PER BOX

## (undated) DEVICE — GLOVE SRG BIOGEL 7.5

## (undated) DEVICE — THREADED CLEAR CANNULA WITH OBTURATOR 8.5MM X 75MM

## (undated) DEVICE — BLADE SHAVER DISSECTOR 4MM 13CM COOLCUT

## (undated) DEVICE — EXPRESSEW III SUTURE NEEDLE FOR USE WITH EXPRESSEW II OR III SUTURE PASSER: Brand: EXPRESSEW

## (undated) DEVICE — PROBE ABLATION  APOLLO RF ASPIRING 90 DEG

## (undated) DEVICE — PACK PBDS SHOULDER ARTHROSCOPY RF

## (undated) DEVICE — DRESSING MEPILEX AG BORDER 4 X 4 IN

## (undated) DEVICE — 3M™ STERI-STRIP™ REINFORCED ADHESIVE SKIN CLOSURES, R1547, 1/2 IN X 4 IN (12 MM X 100 MM), 6 STRIPS/ENVELOPE: Brand: 3M™ STERI-STRIP™

## (undated) DEVICE — THREADED CLEAR CANNULA WITH OBTURATOR 7MM X 75MM

## (undated) DEVICE — GLOVE INDICATOR PI UNDERGLOVE SZ 7.5 BLUE

## (undated) DEVICE — BLADE SHAVER DISSECTOR 3.5MM 13CM COOLCUT

## (undated) DEVICE — SKN PRP WNG SPNGE PVP SCRB STR: Brand: MEDLINE INDUSTRIES, INC.

## (undated) DEVICE — INTENDED FOR TISSUE SEPARATION, AND OTHER PROCEDURES THAT REQUIRE A SHARP SURGICAL BLADE TO PUNCTURE OR CUT.: Brand: BARD-PARKER ® CARBON RIB-BACK BLADES

## (undated) DEVICE — GLOVE SRG BIOGEL ECLIPSE 7

## (undated) DEVICE — SUT MONOCRYL 4-0 PS-2 27 IN Y426H

## (undated) DEVICE — TUBING SUCTION 5MM X 12 FT